# Patient Record
Sex: FEMALE | Race: WHITE | NOT HISPANIC OR LATINO | Employment: OTHER | URBAN - METROPOLITAN AREA
[De-identification: names, ages, dates, MRNs, and addresses within clinical notes are randomized per-mention and may not be internally consistent; named-entity substitution may affect disease eponyms.]

---

## 2017-04-07 ENCOUNTER — TELEPHONE (OUTPATIENT)
Dept: ORTHOPEDIC SURGERY | Facility: CLINIC | Age: 62
End: 2017-04-07

## 2017-04-07 DIAGNOSIS — M17.12 PRIMARY OSTEOARTHRITIS OF LEFT KNEE: Primary | ICD-10-CM

## 2017-05-02 ENCOUNTER — OFFICE VISIT (OUTPATIENT)
Dept: ORTHOPEDIC SURGERY | Facility: CLINIC | Age: 62
End: 2017-05-02

## 2017-05-02 VITALS — WEIGHT: 193 LBS | TEMPERATURE: 97.6 F | BODY MASS INDEX: 37.89 KG/M2 | HEIGHT: 60 IN

## 2017-05-02 DIAGNOSIS — M17.12 ARTHRITIS OF LEFT KNEE: ICD-10-CM

## 2017-05-02 DIAGNOSIS — Z96.651 HISTORY OF TOTAL KNEE ARTHROPLASTY, RIGHT: Primary | ICD-10-CM

## 2017-05-02 DIAGNOSIS — M25.562 CHRONIC PAIN OF LEFT KNEE: Primary | ICD-10-CM

## 2017-05-02 DIAGNOSIS — G89.29 CHRONIC PAIN OF LEFT KNEE: Primary | ICD-10-CM

## 2017-05-02 PROCEDURE — 20610 DRAIN/INJ JOINT/BURSA W/O US: CPT | Performed by: NURSE PRACTITIONER

## 2017-05-02 PROCEDURE — 73562 X-RAY EXAM OF KNEE 3: CPT | Performed by: NURSE PRACTITIONER

## 2017-05-02 RX ORDER — ATORVASTATIN CALCIUM 80 MG/1
80 TABLET, FILM COATED ORAL NIGHTLY
COMMUNITY
Start: 2017-04-30 | End: 2022-07-13 | Stop reason: SINTOL

## 2017-05-02 RX ORDER — MELOXICAM 15 MG/1
TABLET ORAL
Refills: 3 | COMMUNITY
Start: 2017-02-03 | End: 2017-11-17 | Stop reason: HOSPADM

## 2017-05-02 RX ORDER — GUAIFENESIN, PSEUDOEPHEDRINE HYDROCHLORIDE 600; 60 MG/1; MG/1
1 TABLET, EXTENDED RELEASE ORAL 2 TIMES DAILY PRN
COMMUNITY
Start: 2012-10-08 | End: 2022-07-13

## 2017-05-02 RX ORDER — LIDOCAINE HYDROCHLORIDE 10 MG/ML
2 INJECTION, SOLUTION INFILTRATION; PERINEURAL
Status: COMPLETED | OUTPATIENT
Start: 2017-05-02 | End: 2017-05-02

## 2017-05-02 RX ORDER — LISINOPRIL 10 MG/1
TABLET ORAL
Refills: 3 | COMMUNITY
Start: 2017-02-03 | End: 2017-11-07 | Stop reason: SDUPTHER

## 2017-05-02 RX ORDER — GABAPENTIN 300 MG/1
CAPSULE ORAL
Refills: 3 | COMMUNITY
Start: 2017-02-03 | End: 2017-11-07 | Stop reason: SDUPTHER

## 2017-05-02 RX ORDER — METHYLPREDNISOLONE ACETATE 80 MG/ML
80 INJECTION, SUSPENSION INTRA-ARTICULAR; INTRALESIONAL; INTRAMUSCULAR; SOFT TISSUE
Status: COMPLETED | OUTPATIENT
Start: 2017-05-02 | End: 2017-05-02

## 2017-05-02 RX ORDER — CLINDAMYCIN HYDROCHLORIDE 300 MG/1
600 CAPSULE ORAL ONCE
Qty: 2 CAPSULE | Refills: 0 | Status: SHIPPED | OUTPATIENT
Start: 2017-05-02 | End: 2017-11-06 | Stop reason: SDUPTHER

## 2017-05-02 RX ORDER — BUPIVACAINE HYDROCHLORIDE 2.5 MG/ML
2 INJECTION, SOLUTION INFILTRATION; PERINEURAL
Status: COMPLETED | OUTPATIENT
Start: 2017-05-02 | End: 2017-05-02

## 2017-05-02 RX ADMIN — METHYLPREDNISOLONE ACETATE 80 MG: 80 INJECTION, SUSPENSION INTRA-ARTICULAR; INTRALESIONAL; INTRAMUSCULAR; SOFT TISSUE at 13:58

## 2017-05-02 RX ADMIN — BUPIVACAINE HYDROCHLORIDE 2 ML: 2.5 INJECTION, SOLUTION INFILTRATION; PERINEURAL at 13:58

## 2017-05-02 RX ADMIN — LIDOCAINE HYDROCHLORIDE 2 ML: 10 INJECTION, SOLUTION INFILTRATION; PERINEURAL at 13:58

## 2017-07-17 ENCOUNTER — TELEPHONE (OUTPATIENT)
Dept: ORTHOPEDIC SURGERY | Facility: CLINIC | Age: 62
End: 2017-07-17

## 2017-07-18 ENCOUNTER — PREP FOR SURGERY (OUTPATIENT)
Dept: OTHER | Facility: HOSPITAL | Age: 62
End: 2017-07-18

## 2017-07-18 DIAGNOSIS — M17.12 ARTHRITIS OF LEFT KNEE: Primary | ICD-10-CM

## 2017-11-02 NOTE — PROGRESS NOTES
Pre-Operative Orthopedic Assessment      Patient: Risa Jimenez    YOB: 1955      Age/Gender: 62 y.o. female  Medical Record Number: [id number]  Surgical Procedure Planned: LEFT TOTAL KNEE ARTHROPLASTY WITH SARAH NAVIGATION     Surgeon: Jose Muñoz MD    Date of Surgery Planned: 11/15/2017    Question Value Score    Patient Score   1. What is your age group? 50-65 years  66-75 years  >75 years =2  =1  =0 2   2. Gender? Male  Female =2  =1 1   3. How far on average can you walk? (a block is 200 meters) Two blocks or more (+\- rest)  1-2 blocks (+\- rest)  Housebound (most of time) =2  =1  =0 0   4. Which gait aid to you use? (more often than not) None  Single-Point Stick  Crutches/Frame =2  =1  =0 2   5. Do you use community  supports? (home help, meals on wheels, district nursing) None or one per week  Two or more per week =1  =0 1   6. Will you live with someone who can care for you after your operation? Yes  No =3  =0 3      Your Score (out of 12)  9     Key Destination at Discharge from acute care predicted by score   Scores < 6  -- extended inpatient rehabilitation   Scores 6-9 -- additional intervention to discharge directly home (Rehabilitation in home)   Scores > 9 -- directly home         Discharge Disposition/Planning:     Patient Response   Discussed the Predicted discharge disposition needed based on RAPT Assessment with the patient.    yes   Patient selected discharge disposition:   home   Out Patient Rehabilitation Facility of Choice:    none   Home Health Services Preferred:   [facility]   Has the patient completed or been scheduled to complete pre-operative testing? yes   Post-Operative Care Giver Name and Phone Number:    Sister Fabi  [phone]     Subacute Inpatient Rehabilitation:  Complete this section only if planning inpatient services at a Subacute Facility     Patient Response   Subacute Facility Preferred (Please list 2 facilities:      Requires  "pre-certification for inpatient rehabilitation services?         Planned source of transportation to inpatient rehabilitation facility?       If choosing inpatient services at an Acute or Subacute Facility please list a subsequent back-up plan (in case patient fails to qualify for inpatient rehabilitation). Back-up plans should include caregiver (family member or friend) for first 24-48 post- -operatively.       Home Equipment Patient Response   Does patient have a walker for home use?    no   Does patient have a 3 in 1 commode for home use?    no   Does patient have a shower chair for home use?    Can get one if needed   Does patient have an elevated commode seat for home use? Does not feel she will need one as she is \"short\"   Does patient have a cane for home use?    no   Is there any other medical equipment in the home? If so,  List in comment section below no   Pre-Operative Class Attendance Patient Response   Attended or scheduled to attend the pre-operative class within 1 year of total joint replacement? yes   Not planning to attend the pre-operative class (see comments below)    Scheduled and plans to attend   Patient Education  Completed   Expected time of discharge discussed yes   Encouraged to attend Pre-Operative Class    yes   Education re: Back-up plan for patients planning discharge to subacute facilities n/a   Education re: Predicted Discharge Disposition based on RAPT score    yes   Patient receptive and voiced understanding of information given    yes                                                                                                            Comments:    Address verified.  Patient will discharge to sister's home at:  [address] [facility] [address] [facility], [facility].  There are 2 steps to enter with a handrail present then bedroom will be on first level. In patients home bedroom is on second floor. Patient would like agency for HH that was used after right knee and [facility] reveals that [facility] provided " services   in the home. Sister will be able to assist as needed.                                       Signature: Laura Nath RN    Date:  11/2/2017

## 2017-11-06 DIAGNOSIS — Z96.651 HISTORY OF TOTAL KNEE ARTHROPLASTY, RIGHT: ICD-10-CM

## 2017-11-06 RX ORDER — CLINDAMYCIN HYDROCHLORIDE 300 MG/1
CAPSULE ORAL
Qty: 2 CAPSULE | Refills: 3 | Status: SHIPPED | OUTPATIENT
Start: 2017-11-06 | End: 2017-11-07 | Stop reason: SDUPTHER

## 2017-11-07 ENCOUNTER — APPOINTMENT (OUTPATIENT)
Dept: PREADMISSION TESTING | Facility: HOSPITAL | Age: 62
End: 2017-11-07

## 2017-11-07 VITALS
SYSTOLIC BLOOD PRESSURE: 129 MMHG | HEART RATE: 73 BPM | TEMPERATURE: 98.2 F | WEIGHT: 190 LBS | OXYGEN SATURATION: 98 % | BODY MASS INDEX: 37.3 KG/M2 | DIASTOLIC BLOOD PRESSURE: 86 MMHG | RESPIRATION RATE: 20 BRPM | HEIGHT: 60 IN

## 2017-11-07 DIAGNOSIS — M17.12 ARTHRITIS OF LEFT KNEE: ICD-10-CM

## 2017-11-07 LAB
ANION GAP SERPL CALCULATED.3IONS-SCNC: 13.1 MMOL/L
BILIRUB UR QL STRIP: NEGATIVE
BUN BLD-MCNC: 23 MG/DL (ref 8–23)
BUN/CREAT SERPL: 25.3 (ref 7–25)
CALCIUM SPEC-SCNC: 10.2 MG/DL (ref 8.6–10.5)
CHLORIDE SERPL-SCNC: 101 MMOL/L (ref 98–107)
CLARITY UR: CLEAR
CO2 SERPL-SCNC: 24.9 MMOL/L (ref 22–29)
COLOR UR: YELLOW
CREAT BLD-MCNC: 0.91 MG/DL (ref 0.57–1)
DEPRECATED RDW RBC AUTO: 44.6 FL (ref 37–54)
ERYTHROCYTE [DISTWIDTH] IN BLOOD BY AUTOMATED COUNT: 12.9 % (ref 11.7–13)
GFR SERPL CREATININE-BSD FRML MDRD: 62 ML/MIN/1.73
GLUCOSE BLD-MCNC: 112 MG/DL (ref 65–99)
GLUCOSE UR STRIP-MCNC: NEGATIVE MG/DL
HCT VFR BLD AUTO: 37.2 % (ref 35.6–45.5)
HGB BLD-MCNC: 11.9 G/DL (ref 11.9–15.5)
HGB UR QL STRIP.AUTO: NEGATIVE
KETONES UR QL STRIP: NEGATIVE
LEUKOCYTE ESTERASE UR QL STRIP.AUTO: NEGATIVE
MCH RBC QN AUTO: 30.4 PG (ref 26.9–32)
MCHC RBC AUTO-ENTMCNC: 32 G/DL (ref 32.4–36.3)
MCV RBC AUTO: 94.9 FL (ref 80.5–98.2)
NITRITE UR QL STRIP: NEGATIVE
PH UR STRIP.AUTO: 7 [PH] (ref 5–8)
PLATELET # BLD AUTO: 287 10*3/MM3 (ref 140–500)
PMV BLD AUTO: 9.5 FL (ref 6–12)
POTASSIUM BLD-SCNC: 5 MMOL/L (ref 3.5–5.2)
PROT UR QL STRIP: NEGATIVE
RBC # BLD AUTO: 3.92 10*6/MM3 (ref 3.9–5.2)
SODIUM BLD-SCNC: 139 MMOL/L (ref 136–145)
SP GR UR STRIP: 1.02 (ref 1–1.03)
UROBILINOGEN UR QL STRIP: NORMAL
WBC NRBC COR # BLD: 6.31 10*3/MM3 (ref 4.5–10.7)

## 2017-11-07 PROCEDURE — 93005 ELECTROCARDIOGRAM TRACING: CPT

## 2017-11-07 PROCEDURE — 80048 BASIC METABOLIC PNL TOTAL CA: CPT | Performed by: ORTHOPAEDIC SURGERY

## 2017-11-07 PROCEDURE — 93010 ELECTROCARDIOGRAM REPORT: CPT | Performed by: INTERNAL MEDICINE

## 2017-11-07 PROCEDURE — 36415 COLL VENOUS BLD VENIPUNCTURE: CPT

## 2017-11-07 PROCEDURE — 81003 URINALYSIS AUTO W/O SCOPE: CPT | Performed by: ORTHOPAEDIC SURGERY

## 2017-11-07 PROCEDURE — 85027 COMPLETE CBC AUTOMATED: CPT | Performed by: ORTHOPAEDIC SURGERY

## 2017-11-07 RX ORDER — CHOLECALCIFEROL (VITAMIN D3) 50 MCG
2000 TABLET ORAL DAILY
COMMUNITY
End: 2019-04-08

## 2017-11-07 RX ORDER — TRAMADOL HYDROCHLORIDE 50 MG/1
50 TABLET ORAL EVERY 6 HOURS PRN
COMMUNITY
End: 2017-11-17 | Stop reason: HOSPADM

## 2017-11-07 RX ORDER — FLUTICASONE PROPIONATE 50 MCG
2 SPRAY, SUSPENSION (ML) NASAL DAILY
COMMUNITY

## 2017-11-07 RX ORDER — MELOXICAM 15 MG/1
15 TABLET ORAL DAILY
Status: ON HOLD | COMMUNITY
End: 2017-11-15 | Stop reason: SDUPTHER

## 2017-11-07 RX ORDER — GABAPENTIN 300 MG/1
900 CAPSULE ORAL NIGHTLY
COMMUNITY
End: 2019-04-08

## 2017-11-07 RX ORDER — LISINOPRIL 10 MG/1
10 TABLET ORAL NIGHTLY
COMMUNITY
End: 2022-07-13 | Stop reason: SDUPTHER

## 2017-11-07 ASSESSMENT — KOOS JR
KOOS JR SCORE: 52.465
KOOS JR SCORE: 14

## 2017-11-07 NOTE — DISCHARGE INSTRUCTIONS
Take the following medications the morning of surgery with a small sip of water:  NONE      General Instructions:  • Do not eat solid food after midnight the night before surgery.  • You may drink clear liquids day of surgery but must stop at least one hour before your hospital arrival time.  • It is beneficial for you to have a clear drink that contains carbohydrates the day of surgery.  We suggest a 12 to 20 ounce bottle of Gatorade or Powerade for non-diabetic patients or a 12 to 20 ounce bottle of G2 or Powerade Zero for diabetic patients. (Pediatric patients, are not advised to drink a 12 to 20 ounce carbohydrate drink)    Clear liquids are liquids you can see through.  Nothing red in color.     Plain water                               Sports drinks  Sodas                                   Gelatin (Jell-O)  Fruit juices without pulp such as white grape juice and apple juice  Popsicles that contain no fruit or yogurt  Tea or coffee (no cream or milk added)  Gatorade / Powerade  G2 / Powerade Zero    • Infants may have breast milk up to four hours before surgery.  • Infants drinking formula may drink formula up to six hours before surgery.   • Patients who avoid smoking, chewing tobacco and alcohol for 4 weeks prior to surgery have a reduced risk of post-operative complications.  Quit smoking as many days before surgery as you can.  • Do not smoke, use chewing tobacco or drink alcohol the day of surgery.   • If applicable bring your C-PAP/ BI-PAP machine.  • Bring any papers given to you in the doctor’s office.  • Wear clean comfortable clothes and socks.  • Do not wear contact lenses or make-up.  Bring a case for your glasses.   • Bring crutches or walker if applicable.  • Remove all piercings.  Leave jewelry and any other valuables at home.  • The Pre-Admission Testing nurse will instruct you to bring medications if unable to obtain an accurate list in Pre-Admission Testing.        If you were given a blood  bank ID arm band remember to bring it with you the day of surgery.    Preventing a Surgical Site Infection:  • For 2 to 3 days before surgery, avoid shaving with a razor because the razor can irritate skin and make it easier to develop an infection.  • The night prior to surgery sleep in a clean bed with clean clothing.  Do not allow pets to sleep with you.  • Shower on the morning of surgery using a fresh bar of anti-bacterial soap (such as Dial) and clean washcloth.  Dry with a clean towel and dress in clean clothing.  • Ask your surgeon if you will be receiving antibiotics prior to surgery.  • Make sure you, your family, and all healthcare providers clean their hands with soap and water or an alcohol based hand  before caring for you or your wound.    Day of surgery: 11/15/2017 ARRIVAL TIME 7:00 AM  Upon arrival, a Pre-op nurse and Anesthesiologist will review your health history, obtain vital signs, and answer questions you may have.  The only belongings needed at this time will be your home medications and if applicable your C-PAP/BI-PAP machine.  If you are staying overnight your family can leave the rest of your belongings in the car and bring them to your room later.  A Pre-op nurse will start an IV and you may receive medication in preparation for surgery, including something to help you relax.  Your family will be able to see you in the Pre-op area.  While you are in surgery your family should notify the waiting room  if they leave the waiting room area and provide a contact phone number.    Please be aware that surgery does come with discomfort.  We want to make every effort to control your discomfort so please discuss any uncontrolled symptoms with your nurse.   Your doctor will most likely have prescribed pain medications.      If you are going home after surgery you will receive individualized written care instructions before being discharged.  A responsible adult must drive you to  and from the hospital on the day of your surgery and stay with you for 24 hours.    If you are staying overnight following surgery, you will be transported to your hospital room following the recovery period.  [facility] has all private rooms.    If you have any questions please call Pre-Admission Testing at [phone].  Deductibles and co-payments are collected on the day of service. Please be prepared to pay the required co-pay, deductible or deposit on the day of service as defined by your plan.    2% CHLORAHEXIDINE GLUCONATE* CLOTH  Preparing or “prepping” skin before surgery can reduce the risk of infection at the surgical site. To make the process easier, [facility] has chosen disposable cloths moistened with a rinse-free, 2% Chlorhexidine Gluconate (CHG) antiseptic solution. The steps below outline the prepping process and should be carefully followed.        Use the prep cloth on the area that is circled in the diagram             Directions Night before Surgery  1) Shower using a fresh bar of anti-bacterial soap (such as Dial) and clean washcloth.  Use a clean towel to completely dry your skin.  2) Do not use any lotions, oils or creams on your skin.  3) Open the package and remove 1 cloth, wipe your skin for 30 seconds in a circular motion.  Allow to dry for 3 minutes.  4) Repeat #3 with second cloth.  5) Do not touch your eyes, ears, or mouth with the prep cloth.  6) Allow the wet prep solution to air dry.  7) Discard the prep cloth and wash your hands with soap and water.   8) Dress in clean bed clothes and sleep on fresh clean bed sheets.   9) You may experience some temporary itching after the prep.    Directions Day of Surgery  1) Repeat steps 1,2,3,4,5,6,7, and 9.   2) Dress in clean clothes before coming to the hospital.    BACTROBAN NASAL OINTMENT  There are many germs normally in your nose. Bactroban is an ointment that will help reduce these germs. Please follow these  instructions for Bactroban use:      ____The day before surgery in the morning  Date________    ____The day before surgery in the evening              Date________    ____The day of surgery in the morning    Date________    **Squirt ½ package of Bactroban Ointment onto a cotton applicator and apply to inside of 1st nostril.  Squirt the remaining Bactroban and apply to the inside of the other nostril.

## 2017-11-10 ENCOUNTER — OFFICE VISIT (OUTPATIENT)
Dept: ORTHOPEDIC SURGERY | Facility: CLINIC | Age: 62
End: 2017-11-10

## 2017-11-10 VITALS — WEIGHT: 190.2 LBS | BODY MASS INDEX: 37.34 KG/M2 | HEIGHT: 60 IN | TEMPERATURE: 97.9 F

## 2017-11-10 DIAGNOSIS — M17.12 ARTHRITIS OF LEFT KNEE: Primary | ICD-10-CM

## 2017-11-10 PROCEDURE — S0260 H&P FOR SURGERY: HCPCS | Performed by: NURSE PRACTITIONER

## 2017-11-10 PROCEDURE — 73562 X-RAY EXAM OF KNEE 3: CPT | Performed by: NURSE PRACTITIONER

## 2017-11-10 NOTE — PATIENT INSTRUCTIONS
[facility]    KNEE HOME EXERCISES      It is important that you maintain and possibly improve your strength and range of motion of your knee.      •  Walk frequently for short intervals  •  Using a cane or walker to allow you to walk safely if needed  •  Avoid sitting for long periods of time to avoid cramping and swelling of your leg   •  Do exercises either on a bed or in a chair.  •  If any of the exercises cause you pain, either eliminate that exercise or decrease          the motion or repetitions      Start exercises with 10 repetitions and increase to 30 repetitions.  Do two sets of each exercise each day    ANKLE PUMPS    1. Move your feet up and down as if you are pumping on a gas pedal       STRAIGHT LEG RAISES    1. Lie flat with your injured leg straight.  Keep the other leg bent.  2. Tighten the injured leg's thigh muscle.  3. Lift leg, with knee locked in the straight position no higher than the other knee for  seconds  4. Lower leg slowly. Rest for 5 seconds      SHORT ARC QUAD    1. Lie with both knees bent over a pillow  2. Straighten both knees  3. Hold 5 seconds  4. Bend knees back to starting position and repeat sequence       QUADRICEPS     1. Lie flat with your injured let straight.  Keep the other leg bent.  2. Tighten the injured leg's thigh muscle by trying to move your kneecap toward the  thigh.  3. Make your leg as stiff as you can.  4. Hold for 5 seconds and relax for 5 seconds        HAMSTRING STRETCH    1. Lie flat with your injured leg straight. Keep the other leg bent  2. Press your heel of your injured leg into the floor for 5 seconds       OR  1. Sit with injured leg out straight.   2. Loop a sheet around the ball of foot and toes.  3. Gently pull on the sheet, keeping the leg straight  4. Hold for 10-30 seconds      KNEE FLEXION-EXTENSION SITTING     1. Sit with injured let bent as shown  2. Straighten leg at the knee  3. Hold 5 seconds  4. Bend knee back  to starting position   5. Rest for 2-5 seconds and repeat sequence       HEEL SLIDES     1. Lie on back with legs straight  2. Slide heels toward buttocks  3. Return to starting position       HEEL SLIDS WITH SHEET    1. Lie on your back with a sheet wrapped around your foot  2. Pull on the sheet to assist you in bending your knee and sliding your heel toward  your buttocks  3. Hold for 5 seconds        KNEE FLEXION STRETCH    1. Sit in a chair  2. Bend bad knee back as far as you can   3. Hold stretch for 5-10 seconds      CHAIR PUSH UPS    1. Sit in a chair with arm rests  2. Place hands on armrests  3. Straighten arms, raising buttocks up off of chair         WALL SLIDES    1. Stand with your back and head against a wall.    2. Keep your feet shoulder width apart and 6-12 inches from the wall  3. Slowly slide down the wall until your knees are slightly bent.    4. Hold for 5 seconds and then slowly slide back up  5. As you get stronger, then you can slowly increase the bend in your knees, but do  not drop your buttocks below the level of your knees       STEP UPS    1. Stand with your foot on your injured leg on a 3-5 inch support (such as a block of  wood)  2. Place other foot on the floor  3. Shift your weight onto the foot on the block and try to straighten the knee and  raising the other foot off of the floor  4. Slowly lower your foot until only the heel touches the floor

## 2017-11-10 NOTE — PROGRESS NOTES
History & Physical       Patient: Risa Jimenez  YOB: 1955  Medical Record Number: [id number]  Body mass index is 37.15 kg/(m^2).    Surgeon:  Dr. Jose Muñoz    Chief Complaints:   Chief Complaint   Patient presents with   • Left Knee - Pre-op Exam       Subjective:    History of Present Illness: 62 y.o. female presents with   Chief Complaint   Patient presents with   • Left Knee - Pre-op Exam   . Onset of symptoms was years ago and has been progressively worsening despite more conservative treatment measures.  Symptoms are associated with ability to move, exercise, and perform activities of daily living.  Symptoms are aggravated by weight bearing and ROM necessary for activities of daily living.   Symptoms improve with rest, ice and elevation only minimally.      Allergies:   Allergies   Allergen Reactions   • Moxifloxacin Other (See Comments)     HYPERTENSION   • Penicillins Rash       Medications:   Home Medications:  Current Outpatient Prescriptions on File Prior to Visit   Medication Sig   • atorvastatin (LIPITOR) 80 MG tablet Take 80 mg by mouth Every Night.   • CHLORHEXIDINE GLUCONATE CLOTH EX Apply  topically. AS DIRECTED PREOP   • Cholecalciferol (VITAMIN D) 2000 units tablet Take 2,000 Units by mouth Daily. HOLD PRIOR TO SURG   • fluticasone (FLONASE) 50 MCG/ACT nasal spray 2 sprays into each nostril Daily.   • gabapentin (NEURONTIN) 300 MG capsule Take 900 mg by mouth Every Night.   • lisinopril (PRINIVIL,ZESTRIL) 10 MG tablet Take 10 mg by mouth Every Night.   • meloxicam (MOBIC) 15 MG tablet TK 1 T PO QD   • meloxicam (MOBIC) 15 MG tablet Take 15 mg by mouth Daily. HOLD PRIOR TO SURG   • mupirocin (BACTROBAN) 2 % nasal ointment into each nostril. AS DIRECTED PREOP   • pseudoephedrine-guaifenesin (MUCINEX D)  MG per 12 hr tablet Take 1 tablet by mouth 2 (Two) Times a Day As Needed.   • sertraline (ZOLOFT) 50 MG tablet Take 75 mg by mouth Daily.   • traMADol (ULTRAM) 50 MG tablet  Take 50 mg by mouth Every 6 (Six) Hours As Needed for Moderate Pain  (1-2 TABLETS).     No current facility-administered medications on file prior to visit.      Current Medications:  Scheduled Meds:  Continuous Infusions:  No current facility-administered medications for this visit.   PRN Meds:.    I have reviewed the patient's medical history in detail and updated the computerized patient record.  Review and summarization of old records include:    Past Medical History:   Diagnosis Date   • Arthritis    • High cholesterol    • Hypertension    • Left knee pain    • Left leg pain    • Sciatic leg pain     LEFT        Past Surgical History:   Procedure Laterality Date   • BACK SURGERY  2012    Lumbar   • BREAST BIOPSY     • CHOLECYSTECTOMY     • JOINT REPLACEMENT Right 2015   • SINUS SURGERY          Social History     Occupational History   • Not on file.     Social History Main Topics   • Smoking status: Never Smoker   • Smokeless tobacco: Never Used   • Alcohol use Yes      Comment: OCCASIONALLY   • Drug use: No   • Sexual activity: Not on file    Social History     Social History Narrative        Family History   Problem Relation Age of Onset   • Hypertension Sister    • Malig Hyperthermia Neg Hx        ROS: 14 point review of systems was performed and was negative except for documented findings in HPI and today's encounter.     Allergies:   Allergies   Allergen Reactions   • Moxifloxacin Other (See Comments)     HYPERTENSION   • Penicillins Rash     Constitutional:  Denies fever, shaking or chills   Eyes:  Denies change in visual acuity   HENT:  Denies nasal congestion or sore throat   Respiratory:  Denies cough or shortness of breath   Cardiovascular:  Denies chest pain or severe LE edema   GI:  Denies abdominal pain, nausea, vomiting, bloody stools or diarrhea   Musculoskeletal:  Denies numbness tingling or loss of motor function except as outlined above in history of present illness.  : Denies painful  urination or hematuria  Integument:  Denies rash, lesion or ulceration   Neurologic:  Denies headache or focal weakness  Endocrine:  Denies lymphadenopathy  Psych:  Denies confusion or change in mental status   Hem:  Denies active bleeding    Physical Exam: 62 y.o. female  Body mass index is 37.15 kg/(m^2)., @HT@, @WT@  Vitals:    11/10/17 0919   Temp: 97.9 °F (36.6 °C)     Vital signs reviewed.   General Appearance:    Alert, cooperative, in no acute distress                  Eyes: conjunctiva clear  ENT: external ears and nose atraumatic  CV: no peripheral edema  Resp: normal respiratory effort  Skin: no rashes or wounds; normal turgor  Psych: mood and affect appropriate  Lymph: no nodes appreciated  Neuro: gross sensation intact  Vascular:  Palpable peripheral pulse in noted extremity  Musculoskeletal Extremities: DETAILED KNEE Exam: Left knee: Painful gait w/wo limp, muscle atrophy, erythema, ecchymosis, or gross deformity noted, Large knee effusion, + medial joint line tenderness, Active range of motion normal, 5/5 strength flexion and extension, The knee is stable to varus and valgus stress testing, VARUS VALGUS NEUTRAL: varus alignment of the limb, Lachman negative, Posterior drawer negative, Corbin's negative, Patellofemoral grind +, Sensation grossly intact to light tough throughout the lower extremity, Skin is intact, Distal pulses are palpable, No signs or symptoms of DVT          Diagnostic Tests:  Appointment on 11/07/2017   Component Date Value Ref Range Status   • Glucose 11/07/2017 112* 65 - 99 mg/dL Final   • BUN 11/07/2017 23  8 - 23 mg/dL Final   • Creatinine 11/07/2017 0.91  0.57 - 1.00 mg/dL Final   • Sodium 11/07/2017 139  136 - 145 mmol/L Final   • Potassium 11/07/2017 5.0  3.5 - 5.2 mmol/L Final   • Chloride 11/07/2017 101  98 - 107 mmol/L Final   • CO2 11/07/2017 24.9  22.0 - 29.0 mmol/L Final   • Calcium 11/07/2017 10.2  8.6 - 10.5 mg/dL Final   • eGFR Non African Amer 11/07/2017 62  >60  mL/min/1.73 Final   • BUN/Creatinine Ratio 11/07/2017 25.3* 7.0 - 25.0 Final   • Anion Gap 11/07/2017 13.1  mmol/L Final   • WBC 11/07/2017 6.31  4.50 - 10.70 10*3/mm3 Final   • RBC 11/07/2017 3.92  3.90 - 5.20 10*6/mm3 Final   • Hemoglobin 11/07/2017 11.9  11.9 - 15.5 g/dL Final   • Hematocrit 11/07/2017 37.2  35.6 - 45.5 % Final   • MCV 11/07/2017 94.9  80.5 - 98.2 fL Final   • MCH 11/07/2017 30.4  26.9 - 32.0 pg Final   • MCHC 11/07/2017 32.0* 32.4 - 36.3 g/dL Final   • RDW 11/07/2017 12.9  11.7 - 13.0 % Final   • RDW-SD 11/07/2017 44.6  37.0 - 54.0 fl Final   • MPV 11/07/2017 9.5  6.0 - 12.0 fL Final   • Platelets 11/07/2017 287  140 - 500 10*3/mm3 Final   • Color, UA 11/07/2017 Yellow  Yellow, Straw Final   • Appearance, UA 11/07/2017 Clear  Clear Final   • pH, UA 11/07/2017 7.0  5.0 - 8.0 Final   • Specific Gravity, UA 11/07/2017 1.018  1.005 - 1.030 Final   • Glucose, UA 11/07/2017 Negative  Negative Final   • Ketones, UA 11/07/2017 Negative  Negative Final   • Bilirubin, UA 11/07/2017 Negative  Negative Final   • Blood, UA 11/07/2017 Negative  Negative Final   • Protein, UA 11/07/2017 Negative  Negative Final   • Leuk Esterase, UA 11/07/2017 Negative  Negative Final   • Nitrite, UA 11/07/2017 Negative  Negative Final   • Urobilinogen, UA 11/07/2017 0.2 E.U./dL  0.2 - 1.0 E.U./dL Final     No results found.    Imaging was done today, images were personally viewed and discussed at length with the patient:    Indication: pain related symptoms,  Views: 3V AP, LAT & 40 degree PA left knee(s)   Findings: severe end-stage arthritis (bone on bone, subchondral sclerosis/cysts, osteophytes), S/P right  Total Knee Replacement in good position and alignment  Comparison views: viewed last xray done in the office.     Assessment:  Patient Active Problem List   Diagnosis   • Chronic pain of left knee   • Arthritis of left knee       Plan:  Dr. Jose Muñoz reviewed anatomy of a total joint arthroplasty in laymen's  terms, as well as typical postoperative recovery and possibly 6-12 months for maximal recovery, and possible need for rehabilitation stay after hospitalization. We also discussed risks, benefits, alternatives, and limitations of procedure with risks including but not limited to neurovascular damage, bleeding, infection, malalignment, chronic pian, failure of implants, osteolysis, loosening of implants, loss of motion, weakness, stiffness, instability, DVT, pulmonary embolus, death, stroke, complex regional pain syndrome, myocardial infarction, and need for additional procedures. Concept of substitution vs. replacement discussed.  No guarantees were given regarding results of surgery.      Risa Jimenez was given the opportunity to ask and have all questions answered today.  The patient voiced understanding of the risks, benefits, and alternative forms of treatment that were discussed and the patient consents to proceed with surgery.     Patient's blood clot history is negative.  Planned DVT prophylaxis for surgery:  Aspirin    Discharge Plan: POD 2-3 to home and home health    Patient was seen by CARMEN Meyer in the office today.    Date: 11/10/2017  CARMEN Pierre

## 2017-11-15 ENCOUNTER — APPOINTMENT (OUTPATIENT)
Dept: GENERAL RADIOLOGY | Facility: HOSPITAL | Age: 62
End: 2017-11-15

## 2017-11-15 ENCOUNTER — ANESTHESIA EVENT (OUTPATIENT)
Dept: PERIOP | Facility: HOSPITAL | Age: 62
End: 2017-11-15

## 2017-11-15 ENCOUNTER — ANESTHESIA (OUTPATIENT)
Dept: PERIOP | Facility: HOSPITAL | Age: 62
End: 2017-11-15

## 2017-11-15 ENCOUNTER — HOSPITAL ENCOUNTER (INPATIENT)
Facility: HOSPITAL | Age: 62
LOS: 2 days | Discharge: HOME HEALTH | End: 2017-11-17
Attending: ORTHOPAEDIC SURGERY | Admitting: ORTHOPAEDIC SURGERY

## 2017-11-15 DIAGNOSIS — M17.12 ARTHRITIS OF LEFT KNEE: ICD-10-CM

## 2017-11-15 DIAGNOSIS — R26.2 DIFFICULTY WALKING: Primary | ICD-10-CM

## 2017-11-15 LAB — GLUCOSE BLDC GLUCOMTR-MCNC: 113 MG/DL (ref 70–130)

## 2017-11-15 PROCEDURE — 20985 CPTR-ASST DIR MS PX: CPT | Performed by: ORTHOPAEDIC SURGERY

## 2017-11-15 PROCEDURE — C1776 JOINT DEVICE (IMPLANTABLE): HCPCS | Performed by: ORTHOPAEDIC SURGERY

## 2017-11-15 PROCEDURE — 25010000002 MORPHINE PER 10 MG: Performed by: ANESTHESIOLOGY

## 2017-11-15 PROCEDURE — 25010000002 MORPHINE SULFATE (PF) 8 MG/ML SOLUTION 1 ML VIAL: Performed by: ORTHOPAEDIC SURGERY

## 2017-11-15 PROCEDURE — C1713 ANCHOR/SCREW BN/BN,TIS/BN: HCPCS | Performed by: ORTHOPAEDIC SURGERY

## 2017-11-15 PROCEDURE — 27447 TOTAL KNEE ARTHROPLASTY: CPT | Performed by: NURSE PRACTITIONER

## 2017-11-15 PROCEDURE — 27447 TOTAL KNEE ARTHROPLASTY: CPT | Performed by: ORTHOPAEDIC SURGERY

## 2017-11-15 PROCEDURE — 25010000002 EPINEPHRINE PER 0.1 MG: Performed by: ORTHOPAEDIC SURGERY

## 2017-11-15 PROCEDURE — 25010000002 VANCOMYCIN: Performed by: ORTHOPAEDIC SURGERY

## 2017-11-15 PROCEDURE — 25010000002 PROPOFOL 10 MG/ML EMULSION: Performed by: ANESTHESIOLOGY

## 2017-11-15 PROCEDURE — 25010000002 FENTANYL CITRATE (PF) 100 MCG/2ML SOLUTION: Performed by: ANESTHESIOLOGY

## 2017-11-15 PROCEDURE — 97110 THERAPEUTIC EXERCISES: CPT

## 2017-11-15 PROCEDURE — 8E0YXBZ COMPUTER ASSISTED PROCEDURE OF LOWER EXTREMITY: ICD-10-PCS | Performed by: ORTHOPAEDIC SURGERY

## 2017-11-15 PROCEDURE — 82962 GLUCOSE BLOOD TEST: CPT

## 2017-11-15 PROCEDURE — 25010000002 VANCOMYCIN 10 G RECONSTITUTED SOLUTION: Performed by: ORTHOPAEDIC SURGERY

## 2017-11-15 PROCEDURE — 25010000002 KETOROLAC TROMETHAMINE PER 15 MG: Performed by: ORTHOPAEDIC SURGERY

## 2017-11-15 PROCEDURE — 25010000002 MIDAZOLAM PER 1 MG: Performed by: ANESTHESIOLOGY

## 2017-11-15 PROCEDURE — 25010000002 DEXAMETHASONE PER 1 MG: Performed by: ANESTHESIOLOGY

## 2017-11-15 PROCEDURE — 0SRD0J9 REPLACEMENT OF LEFT KNEE JOINT WITH SYNTHETIC SUBSTITUTE, CEMENTED, OPEN APPROACH: ICD-10-PCS | Performed by: ORTHOPAEDIC SURGERY

## 2017-11-15 PROCEDURE — 97162 PT EVAL MOD COMPLEX 30 MIN: CPT

## 2017-11-15 PROCEDURE — 25010000002 NEOSTIGMINE PER 0.5 MG: Performed by: ANESTHESIOLOGY

## 2017-11-15 PROCEDURE — 25010000002 ONDANSETRON PER 1 MG: Performed by: ANESTHESIOLOGY

## 2017-11-15 PROCEDURE — 25010000002 ROPIVACAINE PER 1 MG: Performed by: ORTHOPAEDIC SURGERY

## 2017-11-15 PROCEDURE — 73560 X-RAY EXAM OF KNEE 1 OR 2: CPT

## 2017-11-15 DEVICE — SIGMA TIBIAL INSERT FIXED BEARING CURVED PLUS 2.5 10MM XLK
Type: IMPLANTABLE DEVICE | Status: FUNCTIONAL
Brand: SIGMA

## 2017-11-15 DEVICE — P.F.C. SIGMA TIBIAL TRAY FIXED BEARING MODULAR COCR 2.5 CEMENTED
Type: IMPLANTABLE DEVICE | Status: FUNCTIONAL
Brand: P.F.C. SIGMA

## 2017-11-15 DEVICE — SMARTSET GMV HIGH PERFORMANCE GENTAMICIN MEDIUM VISCOSITY BONE CEMENT 40G
Type: IMPLANTABLE DEVICE | Status: FUNCTIONAL
Brand: SMARTSET

## 2017-11-15 DEVICE — SIGMA FEMORAL CRUCIATE RETAINING CEMENTED 2.5 LEFT
Type: IMPLANTABLE DEVICE | Status: FUNCTIONAL
Brand: SIGMA

## 2017-11-15 DEVICE — IMPLANTABLE DEVICE: Type: IMPLANTABLE DEVICE | Status: FUNCTIONAL

## 2017-11-15 RX ORDER — OXYCODONE AND ACETAMINOPHEN 7.5; 325 MG/1; MG/1
2 TABLET ORAL
Status: DISCONTINUED | OUTPATIENT
Start: 2017-11-15 | End: 2017-11-17 | Stop reason: HOSPADM

## 2017-11-15 RX ORDER — SODIUM CHLORIDE 0.9 % (FLUSH) 0.9 %
1-10 SYRINGE (ML) INJECTION AS NEEDED
Status: DISCONTINUED | OUTPATIENT
Start: 2017-11-15 | End: 2017-11-15 | Stop reason: HOSPADM

## 2017-11-15 RX ORDER — ROCURONIUM BROMIDE 10 MG/ML
INJECTION, SOLUTION INTRAVENOUS AS NEEDED
Status: DISCONTINUED | OUTPATIENT
Start: 2017-11-15 | End: 2017-11-15 | Stop reason: SURG

## 2017-11-15 RX ORDER — DIPHENHYDRAMINE HYDROCHLORIDE 50 MG/ML
6.25 INJECTION INTRAMUSCULAR; INTRAVENOUS
Status: DISCONTINUED | OUTPATIENT
Start: 2017-11-15 | End: 2017-11-15 | Stop reason: HOSPADM

## 2017-11-15 RX ORDER — ONDANSETRON 4 MG/1
4 TABLET, ORALLY DISINTEGRATING ORAL EVERY 6 HOURS PRN
Status: DISCONTINUED | OUTPATIENT
Start: 2017-11-15 | End: 2017-11-17 | Stop reason: HOSPADM

## 2017-11-15 RX ORDER — BISACODYL 10 MG
10 SUPPOSITORY, RECTAL RECTAL DAILY PRN
Status: DISCONTINUED | OUTPATIENT
Start: 2017-11-15 | End: 2017-11-17 | Stop reason: HOSPADM

## 2017-11-15 RX ORDER — GUAIFENESIN 600 MG/1
600 TABLET, EXTENDED RELEASE ORAL EVERY 12 HOURS PRN
Status: DISCONTINUED | OUTPATIENT
Start: 2017-11-15 | End: 2017-11-17 | Stop reason: HOSPADM

## 2017-11-15 RX ORDER — OXYCODONE AND ACETAMINOPHEN 7.5; 325 MG/1; MG/1
1 TABLET ORAL
Status: DISCONTINUED | OUTPATIENT
Start: 2017-11-15 | End: 2017-11-17 | Stop reason: HOSPADM

## 2017-11-15 RX ORDER — OXYCODONE AND ACETAMINOPHEN 7.5; 325 MG/1; MG/1
2 TABLET ORAL
Status: DISCONTINUED | OUTPATIENT
Start: 2017-11-15 | End: 2017-11-15

## 2017-11-15 RX ORDER — EPHEDRINE SULFATE 50 MG/ML
5 INJECTION, SOLUTION INTRAVENOUS ONCE AS NEEDED
Status: DISCONTINUED | OUTPATIENT
Start: 2017-11-15 | End: 2017-11-15 | Stop reason: HOSPADM

## 2017-11-15 RX ORDER — HYDROMORPHONE HYDROCHLORIDE 1 MG/ML
0.5 INJECTION, SOLUTION INTRAMUSCULAR; INTRAVENOUS; SUBCUTANEOUS
Status: DISCONTINUED | OUTPATIENT
Start: 2017-11-15 | End: 2017-11-15 | Stop reason: HOSPADM

## 2017-11-15 RX ORDER — NALOXONE HCL 0.4 MG/ML
0.1 VIAL (ML) INJECTION
Status: DISCONTINUED | OUTPATIENT
Start: 2017-11-15 | End: 2017-11-17 | Stop reason: HOSPADM

## 2017-11-15 RX ORDER — PROPOFOL 10 MG/ML
VIAL (ML) INTRAVENOUS AS NEEDED
Status: DISCONTINUED | OUTPATIENT
Start: 2017-11-15 | End: 2017-11-15 | Stop reason: SURG

## 2017-11-15 RX ORDER — OXYCODONE HYDROCHLORIDE AND ACETAMINOPHEN 5; 325 MG/1; MG/1
2 TABLET ORAL ONCE AS NEEDED
Status: DISCONTINUED | OUTPATIENT
Start: 2017-11-15 | End: 2017-11-15 | Stop reason: HOSPADM

## 2017-11-15 RX ORDER — BISACODYL 5 MG/1
10 TABLET, DELAYED RELEASE ORAL DAILY PRN
Status: DISCONTINUED | OUTPATIENT
Start: 2017-11-15 | End: 2017-11-17 | Stop reason: HOSPADM

## 2017-11-15 RX ORDER — LABETALOL HYDROCHLORIDE 5 MG/ML
5 INJECTION, SOLUTION INTRAVENOUS
Status: DISCONTINUED | OUTPATIENT
Start: 2017-11-15 | End: 2017-11-15 | Stop reason: HOSPADM

## 2017-11-15 RX ORDER — DIPHENHYDRAMINE HYDROCHLORIDE 50 MG/ML
25 INJECTION INTRAMUSCULAR; INTRAVENOUS EVERY 6 HOURS PRN
Status: DISCONTINUED | OUTPATIENT
Start: 2017-11-15 | End: 2017-11-17 | Stop reason: HOSPADM

## 2017-11-15 RX ORDER — DEXAMETHASONE SODIUM PHOSPHATE 10 MG/ML
INJECTION INTRAMUSCULAR; INTRAVENOUS AS NEEDED
Status: DISCONTINUED | OUTPATIENT
Start: 2017-11-15 | End: 2017-11-15 | Stop reason: SURG

## 2017-11-15 RX ORDER — FENTANYL CITRATE 50 UG/ML
50 INJECTION, SOLUTION INTRAMUSCULAR; INTRAVENOUS
Status: DISCONTINUED | OUTPATIENT
Start: 2017-11-15 | End: 2017-11-15 | Stop reason: HOSPADM

## 2017-11-15 RX ORDER — ASPIRIN 325 MG
325 TABLET, DELAYED RELEASE (ENTERIC COATED) ORAL 2 TIMES DAILY
Status: DISCONTINUED | OUTPATIENT
Start: 2017-11-15 | End: 2017-11-17 | Stop reason: HOSPADM

## 2017-11-15 RX ORDER — FLUMAZENIL 0.1 MG/ML
0.2 INJECTION INTRAVENOUS AS NEEDED
Status: DISCONTINUED | OUTPATIENT
Start: 2017-11-15 | End: 2017-11-15 | Stop reason: HOSPADM

## 2017-11-15 RX ORDER — HYDROMORPHONE HCL IN 0.9% NACL 10 MG/50ML
PATIENT CONTROLLED ANALGESIA SYRINGE INTRAVENOUS CONTINUOUS
Status: DISCONTINUED | OUTPATIENT
Start: 2017-11-15 | End: 2017-11-17 | Stop reason: HOSPADM

## 2017-11-15 RX ORDER — GUAIFENESIN, PSEUDOEPHEDRINE HYDROCHLORIDE 600; 60 MG/1; MG/1
1 TABLET, EXTENDED RELEASE ORAL 2 TIMES DAILY PRN
Status: DISCONTINUED | OUTPATIENT
Start: 2017-11-15 | End: 2017-11-15 | Stop reason: CLARIF

## 2017-11-15 RX ORDER — HYDROCODONE BITARTRATE AND ACETAMINOPHEN 7.5; 325 MG/1; MG/1
1 TABLET ORAL ONCE AS NEEDED
Status: DISCONTINUED | OUTPATIENT
Start: 2017-11-15 | End: 2017-11-15 | Stop reason: HOSPADM

## 2017-11-15 RX ORDER — DIPHENHYDRAMINE HCL 25 MG
50 CAPSULE ORAL EVERY 6 HOURS PRN
Status: DISCONTINUED | OUTPATIENT
Start: 2017-11-15 | End: 2017-11-17 | Stop reason: HOSPADM

## 2017-11-15 RX ORDER — OXYCODONE AND ACETAMINOPHEN 7.5; 325 MG/1; MG/1
1 TABLET ORAL
Status: DISCONTINUED | OUTPATIENT
Start: 2017-11-15 | End: 2017-11-15

## 2017-11-15 RX ORDER — ONDANSETRON 2 MG/ML
4 INJECTION INTRAMUSCULAR; INTRAVENOUS EVERY 6 HOURS PRN
Status: DISCONTINUED | OUTPATIENT
Start: 2017-11-15 | End: 2017-11-17 | Stop reason: HOSPADM

## 2017-11-15 RX ORDER — MIDAZOLAM HYDROCHLORIDE 1 MG/ML
2 INJECTION INTRAMUSCULAR; INTRAVENOUS
Status: DISCONTINUED | OUTPATIENT
Start: 2017-11-15 | End: 2017-11-15 | Stop reason: HOSPADM

## 2017-11-15 RX ORDER — HYDRALAZINE HYDROCHLORIDE 20 MG/ML
5 INJECTION INTRAMUSCULAR; INTRAVENOUS
Status: DISCONTINUED | OUTPATIENT
Start: 2017-11-15 | End: 2017-11-15 | Stop reason: HOSPADM

## 2017-11-15 RX ORDER — DOCUSATE SODIUM 100 MG/1
100 CAPSULE, LIQUID FILLED ORAL 2 TIMES DAILY
Status: DISCONTINUED | OUTPATIENT
Start: 2017-11-15 | End: 2017-11-17 | Stop reason: HOSPADM

## 2017-11-15 RX ORDER — GABAPENTIN 300 MG/1
900 CAPSULE ORAL NIGHTLY
Status: DISCONTINUED | OUTPATIENT
Start: 2017-11-15 | End: 2017-11-17 | Stop reason: HOSPADM

## 2017-11-15 RX ORDER — GLYCOPYRROLATE 0.2 MG/ML
INJECTION INTRAMUSCULAR; INTRAVENOUS AS NEEDED
Status: DISCONTINUED | OUTPATIENT
Start: 2017-11-15 | End: 2017-11-15 | Stop reason: SURG

## 2017-11-15 RX ORDER — ONDANSETRON 2 MG/ML
4 INJECTION INTRAMUSCULAR; INTRAVENOUS ONCE AS NEEDED
Status: DISCONTINUED | OUTPATIENT
Start: 2017-11-15 | End: 2017-11-15 | Stop reason: HOSPADM

## 2017-11-15 RX ORDER — FENTANYL CITRATE 50 UG/ML
INJECTION, SOLUTION INTRAMUSCULAR; INTRAVENOUS AS NEEDED
Status: DISCONTINUED | OUTPATIENT
Start: 2017-11-15 | End: 2017-11-15 | Stop reason: SURG

## 2017-11-15 RX ORDER — SODIUM CHLORIDE, SODIUM LACTATE, POTASSIUM CHLORIDE, CALCIUM CHLORIDE 600; 310; 30; 20 MG/100ML; MG/100ML; MG/100ML; MG/100ML
9 INJECTION, SOLUTION INTRAVENOUS CONTINUOUS
Status: DISCONTINUED | OUTPATIENT
Start: 2017-11-15 | End: 2017-11-17 | Stop reason: HOSPADM

## 2017-11-15 RX ORDER — MORPHINE SULFATE 1 MG/ML
INJECTION, SOLUTION EPIDURAL; INTRATHECAL; INTRAVENOUS AS NEEDED
Status: DISCONTINUED | OUTPATIENT
Start: 2017-11-15 | End: 2017-11-15 | Stop reason: SURG

## 2017-11-15 RX ORDER — PSEUDOEPHEDRINE HYDROCHLORIDE 60 MG/1
60 TABLET, FILM COATED ORAL EVERY 12 HOURS PRN
Status: DISCONTINUED | OUTPATIENT
Start: 2017-11-15 | End: 2017-11-17 | Stop reason: HOSPADM

## 2017-11-15 RX ORDER — MIDAZOLAM HYDROCHLORIDE 1 MG/ML
1 INJECTION INTRAMUSCULAR; INTRAVENOUS
Status: DISCONTINUED | OUTPATIENT
Start: 2017-11-15 | End: 2017-11-15 | Stop reason: HOSPADM

## 2017-11-15 RX ORDER — ONDANSETRON 4 MG/1
4 TABLET, FILM COATED ORAL EVERY 6 HOURS PRN
Status: DISCONTINUED | OUTPATIENT
Start: 2017-11-15 | End: 2017-11-17 | Stop reason: HOSPADM

## 2017-11-15 RX ORDER — SODIUM CHLORIDE, SODIUM LACTATE, POTASSIUM CHLORIDE, CALCIUM CHLORIDE 600; 310; 30; 20 MG/100ML; MG/100ML; MG/100ML; MG/100ML
100 INJECTION, SOLUTION INTRAVENOUS CONTINUOUS
Status: ACTIVE | OUTPATIENT
Start: 2017-11-15 | End: 2017-11-16

## 2017-11-15 RX ORDER — MAGNESIUM HYDROXIDE 1200 MG/15ML
LIQUID ORAL AS NEEDED
Status: DISCONTINUED | OUTPATIENT
Start: 2017-11-15 | End: 2017-11-15 | Stop reason: HOSPADM

## 2017-11-15 RX ORDER — PROMETHAZINE HYDROCHLORIDE 12.5 MG/1
12.5 TABLET ORAL EVERY 6 HOURS PRN
Status: DISCONTINUED | OUTPATIENT
Start: 2017-11-15 | End: 2017-11-17 | Stop reason: HOSPADM

## 2017-11-15 RX ORDER — ACETAMINOPHEN 325 MG/1
325 TABLET ORAL EVERY 4 HOURS PRN
Status: DISCONTINUED | OUTPATIENT
Start: 2017-11-15 | End: 2017-11-17 | Stop reason: HOSPADM

## 2017-11-15 RX ORDER — ONDANSETRON 2 MG/ML
INJECTION INTRAMUSCULAR; INTRAVENOUS AS NEEDED
Status: DISCONTINUED | OUTPATIENT
Start: 2017-11-15 | End: 2017-11-15 | Stop reason: SURG

## 2017-11-15 RX ORDER — ATORVASTATIN CALCIUM 80 MG/1
80 TABLET, FILM COATED ORAL NIGHTLY
Status: DISCONTINUED | OUTPATIENT
Start: 2017-11-15 | End: 2017-11-17 | Stop reason: HOSPADM

## 2017-11-15 RX ORDER — FAMOTIDINE 10 MG/ML
20 INJECTION, SOLUTION INTRAVENOUS ONCE
Status: COMPLETED | OUTPATIENT
Start: 2017-11-15 | End: 2017-11-15

## 2017-11-15 RX ORDER — FLUTICASONE PROPIONATE 50 MCG
2 SPRAY, SUSPENSION (ML) NASAL DAILY
Status: DISCONTINUED | OUTPATIENT
Start: 2017-11-15 | End: 2017-11-17 | Stop reason: HOSPADM

## 2017-11-15 RX ORDER — LISINOPRIL 10 MG/1
10 TABLET ORAL NIGHTLY
Status: DISCONTINUED | OUTPATIENT
Start: 2017-11-15 | End: 2017-11-17 | Stop reason: HOSPADM

## 2017-11-15 RX ADMIN — ROCURONIUM BROMIDE 10 MG: 10 INJECTION INTRAVENOUS at 09:15

## 2017-11-15 RX ADMIN — DOCUSATE SODIUM 100 MG: 100 CAPSULE, LIQUID FILLED ORAL at 17:01

## 2017-11-15 RX ADMIN — PROPOFOL 200 MG: 10 INJECTION, EMULSION INTRAVENOUS at 09:01

## 2017-11-15 RX ADMIN — FAMOTIDINE 20 MG: 10 INJECTION, SOLUTION INTRAVENOUS at 08:33

## 2017-11-15 RX ADMIN — SODIUM CHLORIDE, POTASSIUM CHLORIDE, SODIUM LACTATE AND CALCIUM CHLORIDE 9 ML/HR: 600; 310; 30; 20 INJECTION, SOLUTION INTRAVENOUS at 08:21

## 2017-11-15 RX ADMIN — VANCOMYCIN HYDROCHLORIDE 1250 MG: 10 INJECTION, POWDER, LYOPHILIZED, FOR SOLUTION INTRAVENOUS at 19:39

## 2017-11-15 RX ADMIN — GLYCOPYRROLATE 0.4 MG: 0.2 INJECTION INTRAMUSCULAR; INTRAVENOUS at 10:30

## 2017-11-15 RX ADMIN — POLYETHYLENE GLYCOL 3350 17 G: 17 POWDER, FOR SOLUTION ORAL at 17:01

## 2017-11-15 RX ADMIN — FLUTICASONE PROPIONATE 2 SPRAY: 50 SPRAY, METERED NASAL at 16:48

## 2017-11-15 RX ADMIN — MIDAZOLAM 2 MG: 1 INJECTION INTRAMUSCULAR; INTRAVENOUS at 08:33

## 2017-11-15 RX ADMIN — LISINOPRIL 10 MG: 10 TABLET ORAL at 21:28

## 2017-11-15 RX ADMIN — NEOSTIGMINE METHYLSULFATE 4 MG: 1 INJECTION INTRAMUSCULAR; INTRAVENOUS; SUBCUTANEOUS at 10:30

## 2017-11-15 RX ADMIN — SODIUM CHLORIDE, POTASSIUM CHLORIDE, SODIUM LACTATE AND CALCIUM CHLORIDE: 600; 310; 30; 20 INJECTION, SOLUTION INTRAVENOUS at 10:25

## 2017-11-15 RX ADMIN — ASPIRIN 325 MG: 325 TABLET, DELAYED RELEASE ORAL at 17:00

## 2017-11-15 RX ADMIN — GABAPENTIN 900 MG: 300 CAPSULE ORAL at 21:28

## 2017-11-15 RX ADMIN — MORPHINE SULFATE 2 MG: 1 INJECTION EPIDURAL; INTRATHECAL; INTRAVENOUS at 10:15

## 2017-11-15 RX ADMIN — ONDANSETRON 4 MG: 2 INJECTION INTRAMUSCULAR; INTRAVENOUS at 10:20

## 2017-11-15 RX ADMIN — MORPHINE SULFATE 2 MG: 1 INJECTION EPIDURAL; INTRATHECAL; INTRAVENOUS at 10:00

## 2017-11-15 RX ADMIN — MORPHINE SULFATE 2 MG: 1 INJECTION EPIDURAL; INTRATHECAL; INTRAVENOUS at 09:50

## 2017-11-15 RX ADMIN — VANCOMYCIN HYDROCHLORIDE 1250 MG: 10 INJECTION, POWDER, LYOPHILIZED, FOR SOLUTION INTRAVENOUS at 08:20

## 2017-11-15 RX ADMIN — MORPHINE SULFATE 2 MG: 1 INJECTION EPIDURAL; INTRATHECAL; INTRAVENOUS at 10:30

## 2017-11-15 RX ADMIN — VANCOMYCIN HYDROCHLORIDE 1.25 G: 10 INJECTION, POWDER, LYOPHILIZED, FOR SOLUTION INTRAVENOUS at 08:54

## 2017-11-15 RX ADMIN — FENTANYL CITRATE 50 MCG: 50 INJECTION INTRAMUSCULAR; INTRAVENOUS at 11:49

## 2017-11-15 RX ADMIN — ACETAMINOPHEN 325 MG: 325 TABLET ORAL at 19:39

## 2017-11-15 RX ADMIN — Medication: at 11:21

## 2017-11-15 RX ADMIN — ROCURONIUM BROMIDE 40 MG: 10 INJECTION INTRAVENOUS at 09:03

## 2017-11-15 RX ADMIN — SODIUM CHLORIDE, POTASSIUM CHLORIDE, SODIUM LACTATE AND CALCIUM CHLORIDE 9 ML/HR: 600; 310; 30; 20 INJECTION, SOLUTION INTRAVENOUS at 11:22

## 2017-11-15 RX ADMIN — MUPIROCIN 1 APPLICATION: 20 OINTMENT TOPICAL at 17:07

## 2017-11-15 RX ADMIN — FENTANYL CITRATE 50 MCG: 50 INJECTION INTRAMUSCULAR; INTRAVENOUS at 12:05

## 2017-11-15 RX ADMIN — DEXAMETHASONE SODIUM PHOSPHATE 8 MG: 10 INJECTION INTRAMUSCULAR; INTRAVENOUS at 09:10

## 2017-11-15 RX ADMIN — FENTANYL CITRATE 50 MCG: 50 INJECTION, SOLUTION INTRAMUSCULAR; INTRAVENOUS at 08:55

## 2017-11-15 RX ADMIN — ATORVASTATIN CALCIUM 80 MG: 80 TABLET, FILM COATED ORAL at 21:28

## 2017-11-15 RX ADMIN — MORPHINE SULFATE 2 MG: 1 INJECTION EPIDURAL; INTRATHECAL; INTRAVENOUS at 10:39

## 2017-11-15 RX ADMIN — DOCUSATE SODIUM 100 MG: 100 CAPSULE, LIQUID FILLED ORAL at 16:59

## 2017-11-15 RX ADMIN — SERTRALINE 75 MG: 50 TABLET, FILM COATED ORAL at 16:47

## 2017-11-15 RX ADMIN — FENTANYL CITRATE 50 MCG: 50 INJECTION, SOLUTION INTRAMUSCULAR; INTRAVENOUS at 08:59

## 2017-11-15 NOTE — OP NOTE
Operative Note    Name: Risa Jimenez  YOB: 1955  MRN: [id number]  BMI: There is no height or weight on file to calculate BMI. 37    DATE OF SURGERY: 11/15/2017    PREOPERATIVE DIAGNOSIS: left knee end-stage osteoarthritis    POSTOPERATIVE DIAGNOSIS: left knee end-stage osteoarthritis    PROCEDURE PERFORMED: left total knee replacement with [place] navigation    SURGEON: Dr. Jose Muñoz    ASSISTANT: CARMEN Meyer    IMPLANTS: Depuy PFC size 2.5L femoral component, size 2.5 tibial baseplate, 10mm polyethylene insert, patelloplasty    Estimated Blood Loss: 100cc  Specimens : none  Complications: none  22 Modifier:  increased BMI of 37 requiring increased time, effort, and physical exertion of the surgeon and the assistant    DESCRIPTION OF PROCEDURE: The patient was taken to the operating room and placed in the supine position. Preoperative antibiotics were administered. Surgical time out was performed. After adequate induction of anesthesia, the leg was prepped and draped in the usual sterile fashion.  Surgical time out was performed. The leg was exsanguinated with an Esmarch bandage and the tourniquet inflated to 250 mmHg. A midline incision was performed followed by a medial parapatellar arthrotomy. The patella was subluxed laterally.  A portion of the fat pad, ACL, and anterior horns of the meniscus were excised.  The [employer] Shield navigation device was affixed and navigated. The distal cut was made. The femur was then sized with a sizing guide. The femoral cutting block was placed and all femoral cuts were performed. The proximal tibia was exposed. [place] navigation protocol was accomplished.  9 millimeters were removed.  We used the extramedullary tibial cutting guide set for removal of 3mm of bone off the low side. The tibial cut was performed. The posterior horns of the menisci were excised. The posterior osteophytes were removed. Flexion extension blocks were then used to balance the knee.  The tibial cut surface was then sized with the sizing templates and the tibial and femoral trial were then placed. The tray was aligned with the middle third of the tubercle.    Attention was then placed to the patella. The patella was balanced to track centrally through range of motion.  It measured 18 and patelloplasty was performed.   At this point all trial components were removed, the knee was copiously irrigated with pulsed lavage, and the knee was injected with anesthetic cocktail solution. The cut surfaces were then dried with clean lap sponges, and the components were cemented tibia, followed by femura. The knee was held in full extension and all excess cement was removed. The knee was held still until the cement had completely hardened. We then placed the trial polyethylene spacer which resulted in full extension and excellent flexion-extension balance. We placed the final polyethylene spacer.   The knee was then copiously irrigated with the full 3 liters. The tourniquet was then released. There was excellent hemostasis. We closed the knee in multiple layers in standard fashion. Sterile dressing were applied. At the end of the case, the sponge and needle counts were reported as being correct. There were no known complications. The patient was then transported to the recovery room.    Surgical assistant performed retraction, suction, hemostasis, and specific limb positioning and manipulation required for accuracy of joint placement, and assistance in wound closure and dressing application.       Jose Muñoz M.D.

## 2017-11-15 NOTE — PLAN OF CARE
Problem: Patient Care Overview (Adult)  Goal: Plan of Care Review  Outcome: Ongoing (interventions implemented as appropriate)    11/15/17 1576   Coping/Psychosocial Response Interventions   Plan Of Care Reviewed With patient   Patient Care Overview   Progress improving   Outcome Evaluation   Outcome Summary/Follow up Plan arrived from surgery, VSS, minimal c/o pain. tolerating PT.up tochair and voided. educated on monitoring bp and meds due to hx of HTN       Goal: Adult Individualization and Mutuality  Outcome: Ongoing (interventions implemented as appropriate)  Goal: Discharge Needs Assessment  Outcome: Ongoing (interventions implemented as appropriate)    Problem: Perioperative Period (Adult)  Goal: Signs and Symptoms of Listed Potential Problems Will be Absent or Manageable (Perioperative Period)  Outcome: Ongoing (interventions implemented as appropriate)    Problem: Knee Replacement, Total (Adult)  Goal: Signs and Symptoms of Listed Potential Problems Will be Absent or Manageable (Knee Replacement, Total)  Outcome: Ongoing (interventions implemented as appropriate)    Problem: Fall Risk (Adult)  Goal: Identify Related Risk Factors and Signs and Symptoms  Outcome: Outcome(s) achieved Date Met:  11/15/17  Goal: Absence of Falls  Outcome: Ongoing (interventions implemented as appropriate)

## 2017-11-15 NOTE — ANESTHESIA POSTPROCEDURE EVALUATION
Patient: Risa Jimenez    Procedure Summary     Date Anesthesia Start Anesthesia Stop Room / Location    11/15/17 0854 1053  [facility]       Procedure Diagnosis Surgeon Provider    LEFT TOTAL KNEE ARTHROPLASTY WITH SARAH NAVIGATION (Left Knee) Arthritis of left knee  (Arthritis of left knee [M19.90]) MD Dustin Leon MD          Anesthesia Type: general  Last vitals  BP   148/98 (11/15/17 1200)   Temp   36.4 °C (97.5 °F) (11/15/17 1049)   Pulse   75 (11/15/17 1200)   Resp   16 (11/15/17 1200)     SpO2   94 % (11/15/17 1200)     Post Anesthesia Care and Evaluation    Patient location during evaluation: PACU  Anesthetic complications: No anesthetic complications

## 2017-11-15 NOTE — THERAPY EVALUATION
Acute Care - Physical Therapy Initial Evaluation  [facility]     Patient Name: Risa Jimenez  : 1955  MRN: [id number]  Today's Date: 11/15/2017   Onset of Illness/Injury or Date of Surgery Date:  (POD 0 L TKA)            Admit Date: 11/15/2017     Visit Dx:    ICD-10-CM ICD-9-CM   1. Difficulty walking R26.2 719.7   2. Arthritis of left knee M19.90 716.96     Patient Active Problem List   Diagnosis   • Chronic pain of left knee   • Arthritis of left knee     Past Medical History:   Diagnosis Date   • Arthritis    • High cholesterol    • Hypertension    • Left knee pain    • Left leg pain    • Sciatic leg pain     LEFT     Past Surgical History:   Procedure Laterality Date   • BACK SURGERY      Lumbar   • BREAST BIOPSY     • CHOLECYSTECTOMY     • JOINT REPLACEMENT Right 2015   • SINUS SURGERY            PT ASSESSMENT (last 72 hours)      PT Evaluation       11/15/17 1500 11/15/17 0821    Rehab Evaluation    Document Type evaluation  -MG     Subjective Information agree to therapy;no complaints  -MG     Patient Effort, Rehab Treatment good  -MG     Symptoms Noted During/After Treatment none  -MG     General Information    Onset of Illness/Injury or Date of Surgery Date --   POD 0 L TKA  -MG     Precautions/Limitations fall precautions  -MG     Prior Level of Function independent:  -MG     Equipment Currently Used at Home cane, straight  -MG cane, straight  -DK    Plans/Goals Discussed With patient;family  -MG     Barriers to Rehab none identified  -MG     Living Environment    Living Environment Comment going to live with sister, 2 stairs to enter   -MG     Clinical Impression    Patient/Family Goals Statement return home  -MG     Criteria for Skilled Therapeutic Interventions Met yes;treatment indicated  -MG     Impairments Found (describe specific impairments) aerobic capacity/endurance;gait, locomotion, and balance;muscle performance;ROM  -MG     Rehab Potential good, to achieve stated therapy goals   -MG     Pain Assessment    Pain Assessment No/denies pain  -MG     Cognitive Assessment/Intervention    Current Cognitive/Communication Assessment functional  -MG     Orientation Status oriented x 4  -MG     Follows Commands/Answers Questions 100% of the time  -MG     Personal Safety WNL/WFL  -MG     Personal Safety Interventions fall prevention program maintained;gait belt  -MG     ROM (Range of Motion)    General ROM --   0-60 L knee  -MG     MMT (Manual Muscle Testing)    General MMT Assessment Detail L post op knee weakness   -MG     Bed Mobility, Assessment/Treatment    Bed Mob, Supine to Sit, [place] contact guard assist  -MG     Bed Mob, Sit to Supine, [place] not tested  -MG     Transfer Assessment/Treatment    Transfers, Sit-Stand [place] contact guard assist  -MG     Transfers, Stand-Sit [place] contact guard assist  -MG     Transfers, Sit-Stand-Sit, Assist Device rolling walker  -MG     Toilet Transfer, [place] contact guard assist  -MG     Toilet Transfer, Assistive Device rolling walker  -MG     Transfer, Safety Issues balance decreased during turns;step length decreased;weight-shifting ability decreased  -MG     Gait Assessment/Treatment    Gait, [place] Level contact guard assist  -MG     Gait, Assistive Device rolling walker  -MG     Gait, Distance (Feet) 50  -MG     Gait, Gait Pattern Analysis swing-to gait  -MG     Gait, Safety Issues balance decreased during turns;step length decreased  -MG     Gait, Impairments strength decreased;impaired balance  -MG     Therapy Exercises    Exercise Protocols total knee  -MG     Total Knee Exercises left:;10 reps;completed protocol  -MG     Positioning and Restraints    Pre-Treatment Position in bed  -MG     Post Treatment Position chair  -MG     In Chair reclined;call light within reach;encouraged to call for assist;with family/caregiver  -MG       11/15/17 0803       General Information    Equipment Currently Used at Home none   -DK     Living Environment    Lives With alone  -DK     Living Arrangements house  -DK     Home Accessibility no concerns;stairs within home  -DK     Number of Stairs Within Home 12  -DK     Stair Railings at Home inside, present at both sides  -DK     Type of Financial/Environmental Concern none  -DK     Transportation Available car;family or friend will provide  -DK       User Key  (r) = Recorded By, (t) = Taken By, (c) = Cosigned By    Initials Name Provider Type    DK Danelle Zavala, RN Registered Nurse     Megan Gosselin, PT Physical Therapist          Physical Therapy Education     Title: PT OT SLP Therapies (Done)     Topic: Physical Therapy (Done)     Point: Mobility training (Done)    Learning Progress Summary    Learner Readiness Method Response Comment Documented by Status   Patient Acceptance E VU,NR   11/15/17 1509 Done               Point: Home exercise program (Done)    Learning Progress Summary    Learner Readiness Method Response Comment Documented by Status   Patient Acceptance E VU,NR  MG 11/15/17 1509 Done               Point: Body mechanics (Done)    Learning Progress Summary    Learner Readiness Method Response Comment Documented by Status   Patient Acceptance E VU,NR   11/15/17 1509 Done               Point: Precautions (Done)    Learning Progress Summary    Learner Readiness Method Response Comment Documented by Status   Patient Acceptance E VU,NR   11/15/17 1509 Done                      User Key     Initials Effective Dates Name Provider Type Discipline     09/13/17 -  Megan Gosselin, PT Physical Therapist PT                PT Recommendation and Plan  Anticipated Equipment Needs At Discharge:  (has equipment )  Anticipated Discharge Disposition: home with home health  Planned Therapy Interventions: balance training, bed mobility training, gait training, home exercise program, ROM (Range of Motion), patient/family education, stair training, strengthening, transfer training  PT  Frequency: 2 times/day  Plan of Care Review  Outcome Summary/Follow up Plan: Pt is POD0 L TKA. Pt previously independent with SPC. Pt to discharge home with sister.  Pt demosntrates decreased strength, ROm and balance. Will see pt to improve independence. Anticipate d/c home with C when meidcally able.           IP PT Goals       11/15/17 1509          Bed Mobility PT LTG    Bed Mobility PT LTG, Date Established 11/15/17  -MG      Bed Mobility PT LTG, Time to Achieve 4 days  -MG      Bed Mobility PT LTG, Activity Type all bed mobility  -MG      Bed Mobility PT LTG, [place] Level independent  -MG      Transfer Training PT LTG    Transfer Training PT LTG, Date Established 11/15/17  -MG      Transfer Training PT LTG, Time to Achieve 4 days  -MG      Transfer Training PT LTG, Activity Type all transfers  -MG      Transfer Training PT LTG, [place] Level supervision required  -MG      Transfer Training PT LTG, Assist Device walker, rolling  -MG      Gait Training PT LTG    Gait Training Goal PT LTG, Date Established 11/15/17  -MG      Gait Training Goal PT LTG, Time to Achieve 4 days  -MG      Gait Training Goal PT LTG, [place] Level supervision required  -MG      Gait Training Goal PT LTG, Assist Device walker, rolling  -MG      Gait Training Goal PT LTG, Distance to Achieve 150  -MG      Stair Training PT LTG    Stair Training Goal PT LTG, Date Established 11/15/17  -MG      Stair Training Goal PT LTG, Time to Achieve 4 days  -MG      Stair Training Goal PT LTG, Number of Steps 2  -MG      Stair Training Goal PT LTG, [place] Level supervision required  -MG      Stair Training Goal PT LTG, Assist Device 1 handrail  -MG      Patient Education PT LTG    Patient Education PT LTG, Date Established 11/15/17  -MG      Patient Education PT LTG, Time to Achieve 4 days  -MG      Patient Education PT LTG, Education Type HEP  -MG      Patient Education PT LTG, Education Understanding demonstrate  adequately;verbalize understanding  -MG        User Key  (r) = Recorded By, (t) = Taken By, (c) = Cosigned By    Initials Name Provider Type    MG Megan Gosselin, PT Physical Therapist                Outcome Measures       11/15/17 1500          How much help from another person do you currently need...    Turning from your back to your side while in flat bed without using bedrails? 3  -MG      Moving from lying on back to sitting on the side of a flat bed without bedrails? 3  -MG      Moving to and from a bed to a chair (including a wheelchair)? 3  -MG      Standing up from a chair using your arms (e.g., wheelchair, bedside chair)? 3  -MG      Climbing 3-5 steps with a railing? 2  -MG      To walk in hospital room? 3  -MG      AM-PAC 6 Clicks Score 17  -MG      Functional Assessment    Outcome Measure Options AM-PAC 6 Clicks Basic Mobility (PT)  -MG        User Key  (r) = Recorded By, (t) = Taken By, (c) = Cosigned By    Initials Name Provider Type    MG Megan Gosselin, PT Physical Therapist           Time Calculation:         PT Charges       11/15/17 1512          Time Calculation    Start Time 1422  -MG      Stop Time 1448  -MG      Time Calculation (min) 26 min  -MG      PT Received On 11/15/17  -MG      PT - Next Appointment 11/16/17  -MG      PT Goal Re-Cert Due Date 11/18/17  -MG        User Key  (r) = Recorded By, (t) = Taken By, (c) = Cosigned By    Initials Name Provider Type    MG Megan Gosselin, PT Physical Therapist          Therapy Charges for Today     Code Description Service Date Service Provider Modifiers Qty    66163694281 HC PT EVAL MOD COMPLEXITY 2 11/15/2017 Megan Gosselin, PT GP 1    67876987973 HC PT THER PROC EA 15 MIN 11/15/2017 Megan Gosselin, PT GP 1          PT G-Codes  Outcome Measure Options: AM-PAC 6 Clicks Basic Mobility (PT)      Megan Gosselin, PT  11/15/2017

## 2017-11-15 NOTE — PROGRESS NOTES
Discharge Planning Assessment  [facility]     Patient Name: Risa Jimenez  MRN: [id number]  Today's Date: 11/15/2017    Admit Date: 11/15/2017          Discharge Needs Assessment     None            Discharge Plan       11/15/17 1405    Case Management/Social Work Plan    Plan home with sister and [facility]    Patient/Family In Agreement With Plan yes    Additional Comments Pre-op assessment reviewed. Facesheet verified.  Spoke with patient in room.  Introduced self and explained role.  At ME, patient plans to stay with here sister at [address] [place] [address] [address] [id number]. She would like [facility] to follow.  Referral called to Veronica/[facility] and she will follow.         Discharge Placement     No information found                Demographic Summary     None            Functional Status     None            Psychosocial     None            Abuse/Neglect     None            Legal     None            Substance Abuse     None            Patient Forms     None          Sheri Brice RN

## 2017-11-15 NOTE — H&P (VIEW-ONLY)
History & Physical       Patient: Risa Jimenez  YOB: 1955  Medical Record Number: [id number]  Body mass index is 37.15 kg/(m^2).    Surgeon:  Dr. Jose Muñoz    Chief Complaints:   Chief Complaint   Patient presents with   • Left Knee - Pre-op Exam       Subjective:    History of Present Illness: 62 y.o. female presents with   Chief Complaint   Patient presents with   • Left Knee - Pre-op Exam   . Onset of symptoms was years ago and has been progressively worsening despite more conservative treatment measures.  Symptoms are associated with ability to move, exercise, and perform activities of daily living.  Symptoms are aggravated by weight bearing and ROM necessary for activities of daily living.   Symptoms improve with rest, ice and elevation only minimally.      Allergies:   Allergies   Allergen Reactions   • Moxifloxacin Other (See Comments)     HYPERTENSION   • Penicillins Rash       Medications:   Home Medications:  Current Outpatient Prescriptions on File Prior to Visit   Medication Sig   • atorvastatin (LIPITOR) 80 MG tablet Take 80 mg by mouth Every Night.   • CHLORHEXIDINE GLUCONATE CLOTH EX Apply  topically. AS DIRECTED PREOP   • Cholecalciferol (VITAMIN D) 2000 units tablet Take 2,000 Units by mouth Daily. HOLD PRIOR TO SURG   • fluticasone (FLONASE) 50 MCG/ACT nasal spray 2 sprays into each nostril Daily.   • gabapentin (NEURONTIN) 300 MG capsule Take 900 mg by mouth Every Night.   • lisinopril (PRINIVIL,ZESTRIL) 10 MG tablet Take 10 mg by mouth Every Night.   • meloxicam (MOBIC) 15 MG tablet TK 1 T PO QD   • meloxicam (MOBIC) 15 MG tablet Take 15 mg by mouth Daily. HOLD PRIOR TO SURG   • mupirocin (BACTROBAN) 2 % nasal ointment into each nostril. AS DIRECTED PREOP   • pseudoephedrine-guaifenesin (MUCINEX D)  MG per 12 hr tablet Take 1 tablet by mouth 2 (Two) Times a Day As Needed.   • sertraline (ZOLOFT) 50 MG tablet Take 75 mg by mouth Daily.   • traMADol (ULTRAM) 50 MG tablet  Take 50 mg by mouth Every 6 (Six) Hours As Needed for Moderate Pain  (1-2 TABLETS).     No current facility-administered medications on file prior to visit.      Current Medications:  Scheduled Meds:  Continuous Infusions:  No current facility-administered medications for this visit.   PRN Meds:.    I have reviewed the patient's medical history in detail and updated the computerized patient record.  Review and summarization of old records include:    Past Medical History:   Diagnosis Date   • Arthritis    • High cholesterol    • Hypertension    • Left knee pain    • Left leg pain    • Sciatic leg pain     LEFT        Past Surgical History:   Procedure Laterality Date   • BACK SURGERY  2012    Lumbar   • BREAST BIOPSY     • CHOLECYSTECTOMY     • JOINT REPLACEMENT Right 2015   • SINUS SURGERY          Social History     Occupational History   • Not on file.     Social History Main Topics   • Smoking status: Never Smoker   • Smokeless tobacco: Never Used   • Alcohol use Yes      Comment: OCCASIONALLY   • Drug use: No   • Sexual activity: Not on file    Social History     Social History Narrative        Family History   Problem Relation Age of Onset   • Hypertension Sister    • Malig Hyperthermia Neg Hx        ROS: 14 point review of systems was performed and was negative except for documented findings in HPI and today's encounter.     Allergies:   Allergies   Allergen Reactions   • Moxifloxacin Other (See Comments)     HYPERTENSION   • Penicillins Rash     Constitutional:  Denies fever, shaking or chills   Eyes:  Denies change in visual acuity   HENT:  Denies nasal congestion or sore throat   Respiratory:  Denies cough or shortness of breath   Cardiovascular:  Denies chest pain or severe LE edema   GI:  Denies abdominal pain, nausea, vomiting, bloody stools or diarrhea   Musculoskeletal:  Denies numbness tingling or loss of motor function except as outlined above in history of present illness.  : Denies painful  urination or hematuria  Integument:  Denies rash, lesion or ulceration   Neurologic:  Denies headache or focal weakness  Endocrine:  Denies lymphadenopathy  Psych:  Denies confusion or change in mental status   Hem:  Denies active bleeding    Physical Exam: 62 y.o. female  Body mass index is 37.15 kg/(m^2)., @HT@, @WT@  Vitals:    11/10/17 0919   Temp: 97.9 °F (36.6 °C)     Vital signs reviewed.   General Appearance:    Alert, cooperative, in no acute distress                  Eyes: conjunctiva clear  ENT: external ears and nose atraumatic  CV: no peripheral edema  Resp: normal respiratory effort  Skin: no rashes or wounds; normal turgor  Psych: mood and affect appropriate  Lymph: no nodes appreciated  Neuro: gross sensation intact  Vascular:  Palpable peripheral pulse in noted extremity  Musculoskeletal Extremities: DETAILED KNEE Exam: Left knee: Painful gait w/wo limp, muscle atrophy, erythema, ecchymosis, or gross deformity noted, Large knee effusion, + medial joint line tenderness, Active range of motion normal, 5/5 strength flexion and extension, The knee is stable to varus and valgus stress testing, VARUS VALGUS NEUTRAL: varus alignment of the limb, Lachman negative, Posterior drawer negative, Corbin's negative, Patellofemoral grind +, Sensation grossly intact to light tough throughout the lower extremity, Skin is intact, Distal pulses are palpable, No signs or symptoms of DVT          Diagnostic Tests:  Appointment on 11/07/2017   Component Date Value Ref Range Status   • Glucose 11/07/2017 112* 65 - 99 mg/dL Final   • BUN 11/07/2017 23  8 - 23 mg/dL Final   • Creatinine 11/07/2017 0.91  0.57 - 1.00 mg/dL Final   • Sodium 11/07/2017 139  136 - 145 mmol/L Final   • Potassium 11/07/2017 5.0  3.5 - 5.2 mmol/L Final   • Chloride 11/07/2017 101  98 - 107 mmol/L Final   • CO2 11/07/2017 24.9  22.0 - 29.0 mmol/L Final   • Calcium 11/07/2017 10.2  8.6 - 10.5 mg/dL Final   • eGFR Non African Amer 11/07/2017 62  >60  mL/min/1.73 Final   • BUN/Creatinine Ratio 11/07/2017 25.3* 7.0 - 25.0 Final   • Anion Gap 11/07/2017 13.1  mmol/L Final   • WBC 11/07/2017 6.31  4.50 - 10.70 10*3/mm3 Final   • RBC 11/07/2017 3.92  3.90 - 5.20 10*6/mm3 Final   • Hemoglobin 11/07/2017 11.9  11.9 - 15.5 g/dL Final   • Hematocrit 11/07/2017 37.2  35.6 - 45.5 % Final   • MCV 11/07/2017 94.9  80.5 - 98.2 fL Final   • MCH 11/07/2017 30.4  26.9 - 32.0 pg Final   • MCHC 11/07/2017 32.0* 32.4 - 36.3 g/dL Final   • RDW 11/07/2017 12.9  11.7 - 13.0 % Final   • RDW-SD 11/07/2017 44.6  37.0 - 54.0 fl Final   • MPV 11/07/2017 9.5  6.0 - 12.0 fL Final   • Platelets 11/07/2017 287  140 - 500 10*3/mm3 Final   • Color, UA 11/07/2017 Yellow  Yellow, Straw Final   • Appearance, UA 11/07/2017 Clear  Clear Final   • pH, UA 11/07/2017 7.0  5.0 - 8.0 Final   • Specific Gravity, UA 11/07/2017 1.018  1.005 - 1.030 Final   • Glucose, UA 11/07/2017 Negative  Negative Final   • Ketones, UA 11/07/2017 Negative  Negative Final   • Bilirubin, UA 11/07/2017 Negative  Negative Final   • Blood, UA 11/07/2017 Negative  Negative Final   • Protein, UA 11/07/2017 Negative  Negative Final   • Leuk Esterase, UA 11/07/2017 Negative  Negative Final   • Nitrite, UA 11/07/2017 Negative  Negative Final   • Urobilinogen, UA 11/07/2017 0.2 E.U./dL  0.2 - 1.0 E.U./dL Final     No results found.    Imaging was done today, images were personally viewed and discussed at length with the patient:    Indication: pain related symptoms,  Views: 3V AP, LAT & 40 degree PA left knee(s)   Findings: severe end-stage arthritis (bone on bone, subchondral sclerosis/cysts, osteophytes), S/P right  Total Knee Replacement in good position and alignment  Comparison views: viewed last xray done in the office.     Assessment:  Patient Active Problem List   Diagnosis   • Chronic pain of left knee   • Arthritis of left knee       Plan:  Dr. Jose Muñoz reviewed anatomy of a total joint arthroplasty in laymen's  terms, as well as typical postoperative recovery and possibly 6-12 months for maximal recovery, and possible need for rehabilitation stay after hospitalization. We also discussed risks, benefits, alternatives, and limitations of procedure with risks including but not limited to neurovascular damage, bleeding, infection, malalignment, chronic pian, failure of implants, osteolysis, loosening of implants, loss of motion, weakness, stiffness, instability, DVT, pulmonary embolus, death, stroke, complex regional pain syndrome, myocardial infarction, and need for additional procedures. Concept of substitution vs. replacement discussed.  No guarantees were given regarding results of surgery.      Risa Jimenez was given the opportunity to ask and have all questions answered today.  The patient voiced understanding of the risks, benefits, and alternative forms of treatment that were discussed and the patient consents to proceed with surgery.     Patient's blood clot history is negative.  Planned DVT prophylaxis for surgery:  Aspirin    Discharge Plan: POD 2-3 to home and home health    Patient was seen by CARMEN Meyer in the office today.    Date: 11/10/2017  CARMEN Pierre

## 2017-11-15 NOTE — ANESTHESIA PROCEDURE NOTES
Airway  Urgency: elective    Date/Time: 11/15/2017 9:07 AM  Airway not difficult    General Information and Staff    Patient location during procedure: OR  Anesthesiologist: JANIA SALAS    Indications and Patient Condition  Indications for airway management: airway protection    Preoxygenated: yes  Mask difficulty assessment: 1 - vent by mask    Final Airway Details  Final airway type: endotracheal airway      Successful airway: ETT  Cuffed: yes   Successful intubation technique: direct laryngoscopy  Endotracheal tube insertion site: oral  Blade: Franco  Blade size: #2  ETT size: 7.0 mm  Cormack-Lehane Classification: grade I - full view of glottis  Placement verified by: chest auscultation and capnometry   Measured from: teeth  ETT to teeth (cm): 19  Number of attempts at approach: 1    Additional Comments  Pre oxygenated  Easy mask vent  Atraumatic intubation  + etco2  Breath sounds equal bilaterally

## 2017-11-15 NOTE — PLAN OF CARE
Problem: Patient Care Overview (Adult)  Goal: Plan of Care Review    11/15/17 1509   Outcome Evaluation   Outcome Summary/Follow up Plan Pt is POD0 L TKA. Pt previously independent with SPC. Pt to discharge home with sister. Pt demosntrates decreased strength, ROm and balance. Will see pt to improve independence. Anticipate d/c home with [facility] when meidcally able.          Problem: Inpatient Physical Therapy  Goal: Bed Mobility Goal LTG- PT    11/15/17 1509   Bed Mobility PT LTG   Bed Mobility PT LTG, Date Established 11/15/17   Bed Mobility PT LTG, Time to Achieve 4 days   Bed Mobility PT LTG, Activity Type all bed mobility   Bed Mobility PT LTG, [employer] Level independent       Goal: Transfer Training Goal 1 LTG- PT    11/15/17 1509   Transfer Training PT LTG   Transfer Training PT LTG, Date Established 11/15/17   Transfer Training PT LTG, Time to Achieve 4 days   Transfer Training PT LTG, Activity Type all transfers   Transfer Training PT LTG, [employer] Level supervision required   Transfer Training PT LTG, Assist Device walker, rolling       Goal: Gait Training Goal LTG- PT    11/15/17 1509   Gait Training PT LTG   Gait Training Goal PT LTG, Date Established 11/15/17   Gait Training Goal PT LTG, Time to Achieve 4 days   Gait Training Goal PT LTG, [employer] Level supervision required   Gait Training Goal PT LTG, Assist Device walker, rolling   Gait Training Goal PT LTG, Distance to Achieve 150       Goal: Stair Training Goal LTG- PT    11/15/17 1509   Stair Training PT LTG   Stair Training Goal PT LTG, Date Established 11/15/17   Stair Training Goal PT LTG, Time to Achieve 4 days   Stair Training Goal PT LTG, Number of Steps 2   Stair Training Goal PT LTG, [employer] Level supervision required   Stair Training Goal PT LTG, Assist Device 1 handrail       Goal: Patient Education Goal LTG- PT    11/15/17 1509   Patient Education PT LTG   Patient Education PT LTG, Date Established 11/15/17   Patient  Education PT LTG, Time to Achieve 4 days   Patient Education PT LTG, Education Type HEP   Patient Education PT LTG, Education Understanding demonstrate adequately;verbalize understanding

## 2017-11-15 NOTE — ANESTHESIA PREPROCEDURE EVALUATION
Anesthesia Evaluation     Patient summary reviewed and Nursing notes reviewed   NPO Solid Status: > 8 hours  NPO Liquid Status: > 2 hours     Airway   Mallampati: II  no difficulty expected  Dental - normal exam     Pulmonary     breath sounds clear to auscultation  Cardiovascular     ECG reviewed  Rhythm: regular  Rate: normal    (+) hypertension, hyperlipidemia      Neuro/Psych  (+) numbness,    GI/Hepatic/Renal/Endo    (+) obesity,      Musculoskeletal     (+) arthralgias, back pain,   Abdominal    Substance History      OB/GYN          Other   (+) arthritis                                     Anesthesia Plan    ASA 3     general     intravenous induction   Anesthetic plan and risks discussed with patient.    Dry eyes-- would like lubrication

## 2017-11-15 NOTE — PLAN OF CARE
Problem: Patient Care Overview (Adult)  Goal: Plan of Care Review  Outcome: Ongoing (interventions implemented as appropriate)    11/15/17 0821   Coping/Psychosocial Response Interventions   Plan Of Care Reviewed With patient   Patient Care Overview   Progress no change       Goal: Adult Individualization and Mutuality  Outcome: Ongoing (interventions implemented as appropriate)    11/15/17 0821   Individualization   Patient Specific Preferences krissy Lord   Patient Specific Goals walk without pain   Patient Specific Interventions each S&S of infection   Mutuality/Individual Preferences   What Anxieties, Fears or Concerns Do You Have About Your Health or Care? none   What Questions Do You Have About Your Health or Care? none   What Information Would Help Us Give You More Personalized Care? none       Goal: Discharge Needs Assessment  Outcome: Ongoing (interventions implemented as appropriate)    11/15/17 0803 11/15/17 0821   Discharge Needs Assessment   Concerns To Be Addressed --  denies needs/concerns at this time   Readmission Within The Last 30 Days --  no previous admission in last 30 days   Equipment Needed After Discharge --  none   Current Health   Anticipated Changes Related to Illness --  none   Self-Care   Equipment Currently Used at Home --  cane, straight   Living Environment   Transportation Available car;family or friend will provide --          Problem: Perioperative Period (Adult)  Goal: Signs and Symptoms of Listed Potential Problems Will be Absent or Manageable (Perioperative Period)  Outcome: Ongoing (interventions implemented as appropriate)    11/15/17 0821   Perioperative Period   Problems Assessed (Perioperative Period) pain;hypoxia/hypoxemia;situational response   Problems Present (Perioperative Period) none

## 2017-11-16 LAB
ANION GAP SERPL CALCULATED.3IONS-SCNC: 11.9 MMOL/L
BUN BLD-MCNC: 16 MG/DL (ref 8–23)
BUN/CREAT SERPL: 22.5 (ref 7–25)
CALCIUM SPEC-SCNC: 9 MG/DL (ref 8.6–10.5)
CHLORIDE SERPL-SCNC: 99 MMOL/L (ref 98–107)
CO2 SERPL-SCNC: 26.1 MMOL/L (ref 22–29)
CREAT BLD-MCNC: 0.71 MG/DL (ref 0.57–1)
GFR SERPL CREATININE-BSD FRML MDRD: 83 ML/MIN/1.73
GLUCOSE BLD-MCNC: 108 MG/DL (ref 65–99)
HCT VFR BLD AUTO: 32.1 % (ref 35.6–45.5)
HGB BLD-MCNC: 10.6 G/DL (ref 11.9–15.5)
POTASSIUM BLD-SCNC: 4.9 MMOL/L (ref 3.5–5.2)
SODIUM BLD-SCNC: 137 MMOL/L (ref 136–145)

## 2017-11-16 PROCEDURE — 94799 UNLISTED PULMONARY SVC/PX: CPT

## 2017-11-16 PROCEDURE — 25010000002 KETOROLAC TROMETHAMINE PER 15 MG: Performed by: ORTHOPAEDIC SURGERY

## 2017-11-16 PROCEDURE — 85018 HEMOGLOBIN: CPT | Performed by: ORTHOPAEDIC SURGERY

## 2017-11-16 PROCEDURE — 80048 BASIC METABOLIC PNL TOTAL CA: CPT | Performed by: ORTHOPAEDIC SURGERY

## 2017-11-16 PROCEDURE — 97150 GROUP THERAPEUTIC PROCEDURES: CPT

## 2017-11-16 PROCEDURE — 99024 POSTOP FOLLOW-UP VISIT: CPT | Performed by: NURSE PRACTITIONER

## 2017-11-16 PROCEDURE — 97110 THERAPEUTIC EXERCISES: CPT

## 2017-11-16 PROCEDURE — 85014 HEMATOCRIT: CPT | Performed by: ORTHOPAEDIC SURGERY

## 2017-11-16 RX ORDER — ONDANSETRON 4 MG/1
4 TABLET, ORALLY DISINTEGRATING ORAL EVERY 6 HOURS PRN
Qty: 20 TABLET | Refills: 2 | Status: SHIPPED | OUTPATIENT
Start: 2017-11-16 | End: 2018-01-03

## 2017-11-16 RX ORDER — PSEUDOEPHEDRINE HCL 30 MG
100 TABLET ORAL 2 TIMES DAILY
Qty: 60 CAPSULE | Refills: 2 | Status: SHIPPED | OUTPATIENT
Start: 2017-11-16 | End: 2018-01-03

## 2017-11-16 RX ORDER — KETOROLAC TROMETHAMINE 30 MG/ML
30 INJECTION, SOLUTION INTRAMUSCULAR; INTRAVENOUS ONCE
Status: COMPLETED | OUTPATIENT
Start: 2017-11-16 | End: 2017-11-16

## 2017-11-16 RX ORDER — BISACODYL 5 MG/1
10 TABLET, DELAYED RELEASE ORAL DAILY PRN
Qty: 30 TABLET | Refills: 3 | Status: SHIPPED | OUTPATIENT
Start: 2017-11-16 | End: 2018-01-03

## 2017-11-16 RX ORDER — OXYCODONE AND ACETAMINOPHEN 7.5; 325 MG/1; MG/1
TABLET ORAL
Qty: 100 TABLET | Refills: 0 | Status: SHIPPED | OUTPATIENT
Start: 2017-11-16 | End: 2017-11-28 | Stop reason: SDUPTHER

## 2017-11-16 RX ADMIN — DOCUSATE SODIUM 100 MG: 100 CAPSULE, LIQUID FILLED ORAL at 08:22

## 2017-11-16 RX ADMIN — ASPIRIN 325 MG: 325 TABLET, DELAYED RELEASE ORAL at 08:22

## 2017-11-16 RX ADMIN — ATORVASTATIN CALCIUM 80 MG: 80 TABLET, FILM COATED ORAL at 20:28

## 2017-11-16 RX ADMIN — POLYETHYLENE GLYCOL 3350 17 G: 17 POWDER, FOR SOLUTION ORAL at 08:22

## 2017-11-16 RX ADMIN — OXYCODONE HYDROCHLORIDE AND ACETAMINOPHEN 2 TABLET: 7.5; 325 TABLET ORAL at 15:21

## 2017-11-16 RX ADMIN — LISINOPRIL 10 MG: 10 TABLET ORAL at 20:28

## 2017-11-16 RX ADMIN — OXYCODONE HYDROCHLORIDE AND ACETAMINOPHEN 1 TABLET: 7.5; 325 TABLET ORAL at 10:46

## 2017-11-16 RX ADMIN — OXYCODONE HYDROCHLORIDE AND ACETAMINOPHEN 1 TABLET: 7.5; 325 TABLET ORAL at 12:13

## 2017-11-16 RX ADMIN — SERTRALINE 75 MG: 50 TABLET, FILM COATED ORAL at 08:22

## 2017-11-16 RX ADMIN — OXYCODONE HYDROCHLORIDE AND ACETAMINOPHEN 1 TABLET: 7.5; 325 TABLET ORAL at 05:36

## 2017-11-16 RX ADMIN — DOCUSATE SODIUM 100 MG: 100 CAPSULE, LIQUID FILLED ORAL at 17:21

## 2017-11-16 RX ADMIN — FLUTICASONE PROPIONATE 2 SPRAY: 50 SPRAY, METERED NASAL at 08:22

## 2017-11-16 RX ADMIN — MUPIROCIN: 20 OINTMENT TOPICAL at 08:22

## 2017-11-16 RX ADMIN — ASPIRIN 325 MG: 325 TABLET, DELAYED RELEASE ORAL at 17:21

## 2017-11-16 RX ADMIN — POLYETHYLENE GLYCOL 3350 17 G: 17 POWDER, FOR SOLUTION ORAL at 17:21

## 2017-11-16 RX ADMIN — GABAPENTIN 900 MG: 300 CAPSULE ORAL at 20:28

## 2017-11-16 RX ADMIN — OXYCODONE HYDROCHLORIDE AND ACETAMINOPHEN 2 TABLET: 7.5; 325 TABLET ORAL at 21:22

## 2017-11-16 RX ADMIN — KETOROLAC TROMETHAMINE 30 MG: 30 INJECTION, SOLUTION INTRAMUSCULAR at 21:22

## 2017-11-16 RX ADMIN — OXYCODONE HYDROCHLORIDE AND ACETAMINOPHEN 2 TABLET: 7.5; 325 TABLET ORAL at 18:24

## 2017-11-16 RX ADMIN — KETOROLAC TROMETHAMINE 30 MG: 30 INJECTION, SOLUTION INTRAMUSCULAR at 13:22

## 2017-11-16 NOTE — PLAN OF CARE
Problem: Patient Care Overview (Adult)  Goal: Plan of Care Review  Outcome: Ongoing (interventions implemented as appropriate)    11/16/17 0249   Coping/Psychosocial Response Interventions   Plan Of Care Reviewed With patient   Patient Care Overview   Progress improving   Outcome Evaluation   Outcome Summary/Follow up Plan patient ambulating to bathroom and in hallway with assistance, pain controlled at this time, patient educated on b/p monitoring       Goal: Adult Individualization and Mutuality  Outcome: Ongoing (interventions implemented as appropriate)  Goal: Discharge Needs Assessment  Outcome: Ongoing (interventions implemented as appropriate)    Problem: Perioperative Period (Adult)  Goal: Signs and Symptoms of Listed Potential Problems Will be Absent or Manageable (Perioperative Period)  Outcome: Ongoing (interventions implemented as appropriate)    Problem: Knee Replacement, Total (Adult)  Goal: Signs and Symptoms of Listed Potential Problems Will be Absent or Manageable (Knee Replacement, Total)  Outcome: Ongoing (interventions implemented as appropriate)    Problem: Fall Risk (Adult)  Goal: Absence of Falls  Outcome: Ongoing (interventions implemented as appropriate)

## 2017-11-16 NOTE — PLAN OF CARE
Problem: Patient Care Overview (Adult)  Goal: Plan of Care Review  Outcome: Ongoing (interventions implemented as appropriate)    11/16/17 1610   Coping/Psychosocial Response Interventions   Plan Of Care Reviewed With patient   Patient Care Overview   Progress improving   Outcome Evaluation   Outcome Summary/Follow up Plan Pain tolerable with PO pain meds, ambulates in room and hallway with walker and 1 assist, educated pt on bp monitoring r/t htn, pt verblized understanding,.         Problem: Perioperative Period (Adult)  Goal: Signs and Symptoms of Listed Potential Problems Will be Absent or Manageable (Perioperative Period)  Outcome: Ongoing (interventions implemented as appropriate)    Problem: Knee Replacement, Total (Adult)  Goal: Signs and Symptoms of Listed Potential Problems Will be Absent or Manageable (Knee Replacement, Total)  Outcome: Ongoing (interventions implemented as appropriate)    Problem: Fall Risk (Adult)  Goal: Absence of Falls  Outcome: Ongoing (interventions implemented as appropriate)

## 2017-11-16 NOTE — PROGRESS NOTES
Orthopedic Total Joint Progress Note        Patient: Risa Jimeenz    Date of Admission: 11/15/2017  7:10 AM    YOB: 1955    Medical Record Number: [id number]    Attending Physician: Jose Muñoz MD      POD # 1 Day Post-Op Procedure(s) (LRB):  LEFT TOTAL KNEE ARTHROPLASTY WITH SARAH NAVIGATION (Left)       Systemic or Specific Complaints: No Complaints      Allergies:   Allergies   Allergen Reactions   • Moxifloxacin Other (See Comments)     HYPERTENSION   • Penicillins Rash       Medications:   Current Medications:  Scheduled Meds:  aspirin 325 mg Oral BID   atorvastatin 80 mg Oral Nightly   docusate sodium 100 mg Oral BID   fluticasone 2 spray Nasal Daily   gabapentin 900 mg Oral Nightly   lisinopril 10 mg Oral Nightly   mupirocin  Each Nare BID   polyethylene glycol 17 g Oral BID   sertraline 75 mg Oral Daily     Continuous Infusions:  HYDROmorphone HCl-NaCl     lactated ringers 9 mL/hr Last Rate: 9 mL/hr (11/15/17 1122)   lactated ringers 100 mL/hr Last Rate: 100 mL/hr (11/15/17 1649)     PRN Meds:.•  acetaminophen  •  bisacodyl  •  bisacodyl  •  diphenhydrAMINE  •  diphenhydrAMINE  •  guaiFENesin **AND** pseudoephedrine  •  magnesium hydroxide  •  naloxone  •  ondansetron **OR** ondansetron ODT **OR** ondansetron  •  oxyCODONE-acetaminophen **OR** oxyCODONE-acetaminophen  •  promethazine      Physical Exam: 62 y.o. female Body mass index is 37.11 kg/(m^2).  General Appearance:    alert and oriented            Pain Relief: Patient reports good relief Vitals:    11/15/17 1900 11/15/17 2227 11/16/17 0300 11/16/17 0718   BP: 130/81 131/82 96/64 102/64   BP Location: Left arm Right arm Right arm Right arm   Patient Position: Lying Lying Lying Lying   Pulse: 67 62 64 74   Resp: 17 16 16 16   Temp: 97 °F (36.1 °C) 98.2 °F (36.8 °C) 97 °F (36.1 °C) 96.9 °F (36.1 °C)   TempSrc: Oral Oral Oral Oral   SpO2: 96% 99% 100% 100%   Weight:       Height:              HEENT:    Normocephalic, without  obvious abnormality, atraumatic.          PERRLA; EOMI; Neck supple with trachea midline. No JVD.       Lungs:     Respirations regular, even and unlabored      Heart:    Regular rhythm and normal rate.   Abdomen:     Normal bowel sounds, soft non-tender, non-distended       Extremities:   Operative extremity neurovascular status intact. ROM intact.    Incision intact w/out signs or symptoms of infection.  No cyanosis, no calf tenderness     Pulses:     Pulses palpable and equal bilaterally     Skin:     Skin Warm/Dry w/out cyanosis     Activity: Mobilizing Per P.T.   Weight Bearing: As Tolerated    Diagnostic Tests:   Admission on 11/15/2017   Component Date Value Ref Range Status   • Glucose 11/15/2017 113  70 - 130 mg/dL Final   • Hemoglobin 11/16/2017 10.6* 11.9 - 15.5 g/dL Final   • Hematocrit 11/16/2017 32.1* 35.6 - 45.5 % Final   • Glucose 11/16/2017 108* 65 - 99 mg/dL Final   • BUN 11/16/2017 16  8 - 23 mg/dL Final   • Creatinine 11/16/2017 0.71  0.57 - 1.00 mg/dL Final   • Sodium 11/16/2017 137  136 - 145 mmol/L Final   • Potassium 11/16/2017 4.9  3.5 - 5.2 mmol/L Final   • Chloride 11/16/2017 99  98 - 107 mmol/L Final   • CO2 11/16/2017 26.1  22.0 - 29.0 mmol/L Final   • Calcium 11/16/2017 9.0  8.6 - 10.5 mg/dL Final   • eGFR Non African Amer 11/16/2017 83  >60 mL/min/1.73 Final   • BUN/Creatinine Ratio 11/16/2017 22.5  7.0 - 25.0 Final   • Anion Gap 11/16/2017 11.9  mmol/L Final       Xr Knee 1 Or 2 View Left    Result Date: 11/15/2017  Narrative: 2 VIEWS OF THE LEFT KNEE  HISTORY: Postoperative knee pain  FINDINGS: 1. Satisfactory appearance following total knee arthroplasty, no evidence of a complication.   This report was finalized on 11/15/2017 11:21 AM by Dr. Christ Paris MD.      Xr Knee 3 View Left    Impression: Ordering physician's impression is located in the Encounter Note dated 11/10/17.       Personally viewed ortho images and report     Assessment:  Doing well POD  # 1 Day Post-Op following  total joint replacement  Acute Blood Loss Anemia, stable  Post-operative Pain  Limited mobility, requires use of walker and assistance when OOB.    Patient Active Problem List   Diagnosis   • Chronic pain of left knee   • Arthritis of left knee        Plan:  Continue to monitor labs and/or v/s, for tolerance to post op blood loss.  Continue efforts to increase mobilization.  Continue Pain Control Measures.  Continue incisional Care.  DVT prophylaxis.  Follow up in office with Jose Muñoz M.D. In 2 weeks.    Discharge Plan:POD 2-3 to home and home health    Date: 11/16/2017  CARMEN Pierre  Seen today by Jose Muñoz M.D. and CARMEN Meyer

## 2017-11-16 NOTE — THERAPY TREATMENT NOTE
Acute Care - Physical Therapy Treatment Note  [facility]     Patient Name: Risa Jimenez  : 1955  MRN: [id number]  Today's Date: 2017  Onset of Illness/Injury or Date of Surgery Date:  (POD 0 L TKA)          Admit Date: 11/15/2017    Visit Dx:    ICD-10-CM ICD-9-CM   1. Difficulty walking R26.2 719.7   2. Arthritis of left knee M19.90 716.96     Patient Active Problem List   Diagnosis   • Chronic pain of left knee   • Arthritis of left knee               Adult Rehabilitation Note       17 1523 17 0930       Rehab Assessment/Intervention    Discipline physical therapy assistant  - physical therapy assistant  -     Document Type therapy note (daily note)  - therapy note (daily note)  -     Subjective Information agree to therapy;complains of;weakness;fatigue;pain;swelling  - agree to therapy;complains of;fatigue;swelling  -     Precautions/Limitations fall precautions  - fall precautions  -     Recorded by [] Vicky Franco PTA [] Vicky Franco PTA     Pain Assessment    Pain Assessment 0-10  -JM 0-10  -JM     Pain Score 9  -JM 3  -JM     Post Pain Score 7  -JM      Pain Type Surgical pain  -JM Surgical pain  -JM     Pain Location Knee  -JM Knee  -JM     Pain Orientation Left  -JM Left  -JM     Pain Intervention(s) Medication (See MAR);Repositioned;Cold applied  - Medication (See MAR);Repositioned;Cold applied  -     Response to Interventions lionel  -JM lionel  -JM     Recorded by [JM] Vicky Franco PTA [JM] Vicky Franco PTA     ROM (Range of Motion)    General ROM Detail  -6108  -JM     Recorded by  [SABINA] Vicky Franco PTA     Bed Mobility, Assessment/Treatment    Bed Mobility, Scoot/Bridge, [place] conditional independence  - conditional independence  -     Bed Mobility, Comment  in chair  -     Recorded by [SABINA] Vicky Franco PTA [JM] Vicky Franco PTA     Transfer Assessment/Treatment    Transfers, Sit-Stand [place] stand by  assist;verbal cues required  -JM stand by assist;verbal cues required  -JM     Transfers, Stand-Sit [place] stand by assist;verbal cues required  -JM stand by assist;verbal cues required  -JM     Transfers, Sit-Stand-Sit, Assist Device rolling walker  -JM rolling walker  -JM     Transfer, Comment  slightly impulsive  -JM     Recorded by [SABINA] Vicky Franco PTA [SABINA] Vicky Franco PTA     Gait Assessment/Treatment    Gait, [place] Level contact guard assist;stand by assist;verbal cues required  -JM contact guard assist;stand by assist;verbal cues required  -JM     Gait, Assistive Device rolling walker  -JM rolling walker  -JM     Gait, Distance (Feet) 150  -  -JM     Gait, Safety Issues  step length decreased  -     Gait, Comment  fast-paced; slowed w/cues  -JM     Recorded by [SABINA] Vicky Franco PTA [JM] Vicky Franco PTA     Stairs Assessment/Treatment    Stairs, Comment pt declined due to incr pain-try in am prior to d/c  -JM try in pm  -     Recorded by [SABINA] Vicky Franco PTA [JM] Vicky Franco PTA     Therapy Exercises    Exercise Protocols total knee  - total knee  -     Total Knee Exercises left:;completed protocol;20 reps   cues for each due to falling asleep   - left:;15 reps;completed protocol  -     Recorded by [SABINA] Vicky Franco PTA [JM] Vicky Franco PTA     Positioning and Restraints    Pre-Treatment Position sitting in chair/recliner  - sitting in chair/recliner  -     Post Treatment Position bathroom  -JM      In Chair  reclined;call light within reach;encouraged to call for assist;notified nsg  -     Bathroom sitting;call light within reach;encouraged to call for assist;with family/caregiver  -      Recorded by [SABINA] Vicky Franco PTA [JM] Vicky Franco PTA       User Key  (r) = Recorded By, (t) = Taken By, (c) = Cosigned By    Initials Name Effective Dates    SABINA Franco PTA 02/18/16 -                 IP PT Goals       11/15/17  1509          Bed Mobility PT LTG    Bed Mobility PT LTG, Date Established 11/15/17  -MG      Bed Mobility PT LTG, Time to Achieve 4 days  -MG      Bed Mobility PT LTG, Activity Type all bed mobility  -MG      Bed Mobility PT LTG, [place] Level independent  -MG      Transfer Training PT LTG    Transfer Training PT LTG, Date Established 11/15/17  -MG      Transfer Training PT LTG, Time to Achieve 4 days  -MG      Transfer Training PT LTG, Activity Type all transfers  -MG      Transfer Training PT LTG, [place] Level supervision required  -MG      Transfer Training PT LTG, Assist Device walker, rolling  -MG      Gait Training PT LTG    Gait Training Goal PT LTG, Date Established 11/15/17  -MG      Gait Training Goal PT LTG, Time to Achieve 4 days  -MG      Gait Training Goal PT LTG, [place] Level supervision required  -MG      Gait Training Goal PT LTG, Assist Device walker, rolling  -MG      Gait Training Goal PT LTG, Distance to Achieve 150  -MG      Stair Training PT LTG    Stair Training Goal PT LTG, Date Established 11/15/17  -MG      Stair Training Goal PT LTG, Time to Achieve 4 days  -MG      Stair Training Goal PT LTG, Number of Steps 2  -MG      Stair Training Goal PT LTG, [place] Level supervision required  -MG      Stair Training Goal PT LTG, Assist Device 1 handrail  -MG      Patient Education PT LTG    Patient Education PT LTG, Date Established 11/15/17  -MG      Patient Education PT LTG, Time to Achieve 4 days  -MG      Patient Education PT LTG, Education Type HEP  -MG      Patient Education PT LTG, Education Understanding demonstrate adequately;verbalize understanding  -MG        User Key  (r) = Recorded By, (t) = Taken By, (c) = Cosigned By    Initials Name Provider Type    MG Megan Gosselin, PT Physical Therapist          Physical Therapy Education     Title: PT OT SLP Therapies (Done)     Topic: Physical Therapy (Done)     Point: Mobility training (Done)    Learning Progress  Summary    Learner Readiness Method Response Comment Documented by Status   Patient Eager E,TB,D VU,NR   11/16/17 1522 Done    Acceptance E VU,NR  MG 11/15/17 1509 Done               Point: Home exercise program (Done)    Learning Progress Summary    Learner Readiness Method Response Comment Documented by Status   Patient Eager E,TB,D VU,NR   11/16/17 1522 Done    Acceptance E VU,NR  MG 11/15/17 1509 Done               Point: Body mechanics (Done)    Learning Progress Summary    Learner Readiness Method Response Comment Documented by Status   Patient Eager E,TB,D VU,NR   11/16/17 1522 Done    Acceptance E VU,NR  MG 11/15/17 1509 Done               Point: Precautions (Done)    Learning Progress Summary    Learner Readiness Method Response Comment Documented by Status   Patient Eager E,TB,D VU,NR   11/16/17 1522 Done    Acceptance E VU,NR  MG 11/15/17 1509 Done                      User Key     Initials Effective Dates Name Provider Type Discipline     02/18/16 -  Vicky Franco, PTA Physical Therapy Assistant PT     09/13/17 -  Megan Gosselin, PT Physical Therapist PT                    PT Recommendation and Plan  Anticipated Equipment Needs At Discharge:  (has equipment )  Anticipated Discharge Disposition: home with home health  Planned Therapy Interventions: balance training, bed mobility training, gait training, home exercise program, ROM (Range of Motion), patient/family education, stair training, strengthening, transfer training  PT Frequency: 2 times/day  Plan of Care Review  Plan Of Care Reviewed With: patient  Progress: improving  Outcome Summary/Follow up Plan: incr amb dist, excellent ROM; pt denies numbness          Outcome Measures       11/16/17 1500 11/15/17 1500       How much help from another person do you currently need...    Turning from your back to your side while in flat bed without using bedrails? 4  -JM 3  -MG     Moving from lying on back to sitting on the side of a flat bed  without bedrails? 4  -JM 3  -MG     Moving to and from a bed to a chair (including a wheelchair)? 3  -JM 3  -MG     Standing up from a chair using your arms (e.g., wheelchair, bedside chair)? 4  -JM 3  -MG     Climbing 3-5 steps with a railing? 3  -JM 2  -MG     To walk in hospital room? 3  -JM 3  -MG     AM-PAC 6 Clicks Score 21  -JM 17  -MG     Functional Assessment    Outcome Measure Options  AM-PAC 6 Clicks Basic Mobility (PT)  -MG       User Key  (r) = Recorded By, (t) = Taken By, (c) = Cosigned By    Initials Name Provider Type    SABINA Franco PTA Physical Therapy Assistant    MG Megan Gosselin, PT Physical Therapist           Time Calculation:         PT Charges       11/16/17 1526 11/16/17 1522       Time Calculation    Start Time 1320  - 0920  -     Stop Time 1408  - 1015  -     Time Calculation (min) 48 min  - 55 min  -     PT Received On 11/16/17  - 11/16/17  -SABINA     PT - Next Appointment 11/17/17  - 11/16/17  -       User Key  (r) = Recorded By, (t) = Taken By, (c) = Cosigned By    Initials Name Provider Type    SABINA Franco PTA Physical Therapy Assistant          Therapy Charges for Today     Code Description Service Date Service Provider Modifiers Qty    56875393399 HC PT THER PROC EA 15 MIN 11/16/2017 Vicky Franco PTA GP 1    0195397 HC PT THER PROC GROUP 11/16/2017 Vicky Franco PTA GP 1    15484927325 HC PT THER PROC EA 15 MIN 11/16/2017 Vicky Franco PTA GP 1    39889331604 HC PT THER PROC GROUP 11/16/2017 Vicky Franco PTA GP 1          PT G-Codes  Outcome Measure Options: AM-PAC 6 Clicks Basic Mobility (PT)    Vicky Franco PTA  11/16/2017

## 2017-11-16 NOTE — PLAN OF CARE
Problem: Patient Care Overview (Adult)  Goal: Plan of Care Review  Outcome: Ongoing (interventions implemented as appropriate)    11/16/17 1521   Coping/Psychosocial Response Interventions   Plan Of Care Reviewed With patient   Patient Care Overview   Progress improving   Outcome Evaluation   Outcome Summary/Follow up Plan incr amb dist, excellent ROM; pt denies numbness

## 2017-11-16 NOTE — THERAPY TREATMENT NOTE
Acute Care - Physical Therapy Treatment Note  [facility]     Patient Name: Risa Jimenez  : 1955  MRN: [id number]  Today's Date: 2017  Onset of Illness/Injury or Date of Surgery Date:  (POD 0 L TKA)          Admit Date: 11/15/2017    Visit Dx:    ICD-10-CM ICD-9-CM   1. Difficulty walking R26.2 719.7   2. Arthritis of left knee M19.90 716.96     Patient Active Problem List   Diagnosis   • Chronic pain of left knee   • Arthritis of left knee               Adult Rehabilitation Note       17 0930          Rehab Assessment/Intervention    Discipline physical therapy assistant  -      Document Type therapy note (daily note)  -      Subjective Information agree to therapy;complains of;fatigue;swelling  -      Precautions/Limitations fall precautions  -      Recorded by [] Vicky Franco PTA      Pain Assessment    Pain Assessment 0-10  -      Pain Score 3  -      Pain Type Surgical pain  -      Pain Location Knee  -      Pain Orientation Left  -      Pain Intervention(s) Medication (See MAR);Repositioned;Cold applied  -      Response to Interventions lionel  -      Recorded by [SABINA] Vicky Franco PTA      ROM (Range of Motion)    General ROM Detail -6-108  -      Recorded by [SABINA] Vicky Franco PTA      Bed Mobility, Assessment/Treatment    Bed Mobility, Scoot/Bridge, [place] conditional independence  -      Bed Mobility, Comment in chair  -      Recorded by [SABINA] Vicky Franco PTA      Transfer Assessment/Treatment    Transfers, Sit-Stand [place] stand by assist;verbal cues required  -      Transfers, Stand-Sit [place] stand by assist;verbal cues required  -      Transfers, Sit-Stand-Sit, Assist Device rolling walker  -      Transfer, Comment slightly impulsive  -      Recorded by [JM] Vicky Franco PTA      Gait Assessment/Treatment    Gait, [place] Level contact guard assist;stand by assist;verbal cues required  -      Gait,  Assistive Device rolling walker  -      Gait, Distance (Feet) 150  -JM      Gait, Safety Issues step length decreased  -      Gait, Comment fast-paced; slowed w/cues  -JM      Recorded by [SABINA] Vicky Franco PTA      Stairs Assessment/Treatment    Stairs, Comment try in pm  -JM      Recorded by [SABINA] Vicky Franco PTA      Therapy Exercises    Exercise Protocols total knee  -      Total Knee Exercises left:;15 reps;completed protocol  -      Recorded by [SABINA] Vicky Franco PTA      Positioning and Restraints    Pre-Treatment Position sitting in chair/recliner  -      In Chair reclined;call light within reach;encouraged to call for assist;notified nsg  -JM      Recorded by [SABINA] Vicky Franco PTA        User Key  (r) = Recorded By, (t) = Taken By, (c) = Cosigned By    Initials Name Effective Dates    SABINA Franco PTA 02/18/16 -                 IP PT Goals       11/15/17 1509          Bed Mobility PT LTG    Bed Mobility PT LTG, Date Established 11/15/17  -MG      Bed Mobility PT LTG, Time to Achieve 4 days  -MG      Bed Mobility PT LTG, Activity Type all bed mobility  -MG      Bed Mobility PT LTG, [place] Level independent  -MG      Transfer Training PT LTG    Transfer Training PT LTG, Date Established 11/15/17  -MG      Transfer Training PT LTG, Time to Achieve 4 days  -MG      Transfer Training PT LTG, Activity Type all transfers  -MG      Transfer Training PT LTG, [place] Level supervision required  -MG      Transfer Training PT LTG, Assist Device walker, rolling  -MG      Gait Training PT LTG    Gait Training Goal PT LTG, Date Established 11/15/17  -MG      Gait Training Goal PT LTG, Time to Achieve 4 days  -MG      Gait Training Goal PT LTG, [place] Level supervision required  -MG      Gait Training Goal PT LTG, Assist Device walker, rolling  -MG      Gait Training Goal PT LTG, Distance to Achieve 150  -MG      Stair Training PT LTG    Stair Training Goal PT LTG, Date  Established 11/15/17  -MG      Stair Training Goal PT LTG, Time to Achieve 4 days  -MG      Stair Training Goal PT LTG, Number of Steps 2  -MG      Stair Training Goal PT LTG, [place] Level supervision required  -MG      Stair Training Goal PT LTG, Assist Device 1 handrail  -MG      Patient Education PT LTG    Patient Education PT LTG, Date Established 11/15/17  -MG      Patient Education PT LTG, Time to Achieve 4 days  -MG      Patient Education PT LTG, Education Type HEP  -MG      Patient Education PT LTG, Education Understanding demonstrate adequately;verbalize understanding  -MG        User Key  (r) = Recorded By, (t) = Taken By, (c) = Cosigned By    Initials Name Provider Type    MG Megan Gosselin, PT Physical Therapist          Physical Therapy Education     Title: PT OT SLP Therapies (Done)     Topic: Physical Therapy (Done)     Point: Mobility training (Done)    Learning Progress Summary    Learner Readiness Method Response Comment Documented by Status   Patient Eager E,PEPPER ARAUJO VUNR   11/16/17 1522 Done    Acceptance E VU,NR  MG 11/15/17 1509 Done               Point: Home exercise program (Done)    Learning Progress Summary    Learner Readiness Method Response Comment Documented by Status   Patient Eager E,TBPEPPER VUNR   11/16/17 1522 Done    Acceptance E VU,NR  MG 11/15/17 1509 Done               Point: Body mechanics (Done)    Learning Progress Summary    Learner Readiness Method Response Comment Documented by Status   Patient Eager E,TBPEPPER VU,NR   11/16/17 1522 Done    Acceptance E VU,NR  MG 11/15/17 1509 Done               Point: Precautions (Done)    Learning Progress Summary    Learner Readiness Method Response Comment Documented by Status   Patient Eager E,TBPEPPER VU,NR   11/16/17 1522 Done    Acceptance E VU,NR  MG 11/15/17 1509 Done                      User Key     Initials Effective Dates Name Provider Type Discipline     02/18/16 -  Vicky Franco PTA Physical Therapy Assistant PT    MG  09/13/17 -  Megan Gosselin, PT Physical Therapist PT                    PT Recommendation and Plan  Anticipated Equipment Needs At Discharge:  (has equipment )  Anticipated Discharge Disposition: home with home health  Planned Therapy Interventions: balance training, bed mobility training, gait training, home exercise program, ROM (Range of Motion), patient/family education, stair training, strengthening, transfer training  PT Frequency: 2 times/day  Plan of Care Review  Plan Of Care Reviewed With: patient  Progress: improving  Outcome Summary/Follow up Plan: incr amb dist, excellent ROM; pt denies numbness          Outcome Measures       11/16/17 1500 11/15/17 1500       How much help from another person do you currently need...    Turning from your back to your side while in flat bed without using bedrails? 4  -JM 3  -MG     Moving from lying on back to sitting on the side of a flat bed without bedrails? 4  - 3  -MG     Moving to and from a bed to a chair (including a wheelchair)? 3  -JM 3  -MG     Standing up from a chair using your arms (e.g., wheelchair, bedside chair)? 4  - 3  -MG     Climbing 3-5 steps with a railing? 3  -JM 2  -MG     To walk in hospital room? 3  -JM 3  -MG     AM-PAC 6 Clicks Score 21  - 17  -MG     Functional Assessment    Outcome Measure Options  AM-PAC 6 Clicks Basic Mobility (PT)  -       User Key  (r) = Recorded By, (t) = Taken By, (c) = Cosigned By    Initials Name Provider Type    SABINA Franco PTA Physical Therapy Assistant     Megan Gosselin, PT Physical Therapist           Time Calculation:         PT Charges       11/16/17 1522          Time Calculation    Start Time 0920  -      Stop Time 1015  -      Time Calculation (min) 55 min  -      PT Received On 11/16/17  -      PT - Next Appointment 11/16/17  -        User Key  (r) = Recorded By, (t) = Taken By, (c) = Cosigned By    Initials Name Provider Type    SABINA Franco PTA Physical Therapy  Assistant          Therapy Charges for Today     Code Description Service Date Service Provider Modifiers Qty    81141214961 HC PT THER PROC EA 15 MIN 11/16/2017 Vicky Franco, PTA GP 1    74079542391 HC PT THER PROC GROUP 11/16/2017 Vicky Franco PTA GP 1          PT G-Codes  Outcome Measure Options: AM-PAC 6 Clicks Basic Mobility (PT)    Vicky Franco, TIGRE  11/16/2017

## 2017-11-17 VITALS
WEIGHT: 190 LBS | OXYGEN SATURATION: 92 % | SYSTOLIC BLOOD PRESSURE: 98 MMHG | DIASTOLIC BLOOD PRESSURE: 62 MMHG | RESPIRATION RATE: 16 BRPM | BODY MASS INDEX: 37.3 KG/M2 | TEMPERATURE: 97 F | HEART RATE: 71 BPM | HEIGHT: 60 IN

## 2017-11-17 LAB
HCT VFR BLD AUTO: 31.4 % (ref 35.6–45.5)
HGB BLD-MCNC: 10.1 G/DL (ref 11.9–15.5)

## 2017-11-17 PROCEDURE — 85018 HEMOGLOBIN: CPT | Performed by: ORTHOPAEDIC SURGERY

## 2017-11-17 PROCEDURE — 85014 HEMATOCRIT: CPT | Performed by: ORTHOPAEDIC SURGERY

## 2017-11-17 PROCEDURE — 94799 UNLISTED PULMONARY SVC/PX: CPT

## 2017-11-17 PROCEDURE — 99024 POSTOP FOLLOW-UP VISIT: CPT | Performed by: NURSE PRACTITIONER

## 2017-11-17 RX ADMIN — OXYCODONE HYDROCHLORIDE AND ACETAMINOPHEN 2 TABLET: 7.5; 325 TABLET ORAL at 09:53

## 2017-11-17 RX ADMIN — FLUTICASONE PROPIONATE 2 SPRAY: 50 SPRAY, METERED NASAL at 08:00

## 2017-11-17 RX ADMIN — OXYCODONE HYDROCHLORIDE AND ACETAMINOPHEN 2 TABLET: 7.5; 325 TABLET ORAL at 06:39

## 2017-11-17 RX ADMIN — ASPIRIN 325 MG: 325 TABLET, DELAYED RELEASE ORAL at 08:00

## 2017-11-17 RX ADMIN — OXYCODONE HYDROCHLORIDE AND ACETAMINOPHEN 2 TABLET: 7.5; 325 TABLET ORAL at 00:24

## 2017-11-17 RX ADMIN — DOCUSATE SODIUM 100 MG: 100 CAPSULE, LIQUID FILLED ORAL at 08:00

## 2017-11-17 RX ADMIN — POLYETHYLENE GLYCOL 3350 17 G: 17 POWDER, FOR SOLUTION ORAL at 08:00

## 2017-11-17 RX ADMIN — SERTRALINE 75 MG: 50 TABLET, FILM COATED ORAL at 08:00

## 2017-11-17 RX ADMIN — MUPIROCIN: 20 OINTMENT TOPICAL at 08:00

## 2017-11-17 RX ADMIN — OXYCODONE HYDROCHLORIDE AND ACETAMINOPHEN 2 TABLET: 7.5; 325 TABLET ORAL at 03:30

## 2017-11-17 NOTE — PLAN OF CARE
Problem: Patient Care Overview (Adult)  Goal: Plan of Care Review  Outcome: Ongoing (interventions implemented as appropriate)    11/17/17 0237   Coping/Psychosocial Response Interventions   Plan Of Care Reviewed With patient   Patient Care Overview   Progress improving   Outcome Evaluation   Outcome Summary/Follow up Plan patient ambulating with assistance to bathroom, pain intolerable at start of shift, patient states toradol is only thing that helps with pain, patient educated on b/p monitoring       Goal: Adult Individualization and Mutuality  Outcome: Ongoing (interventions implemented as appropriate)  Goal: Discharge Needs Assessment  Outcome: Ongoing (interventions implemented as appropriate)    Problem: Knee Replacement, Total (Adult)  Goal: Signs and Symptoms of Listed Potential Problems Will be Absent or Manageable (Knee Replacement, Total)  Outcome: Ongoing (interventions implemented as appropriate)    Problem: Fall Risk (Adult)  Goal: Absence of Falls  Outcome: Ongoing (interventions implemented as appropriate)

## 2017-11-17 NOTE — PROGRESS NOTES
Orthopedic Total Joint Progress Note        Patient: Risa Jimenez    Date of Admission: 11/15/2017  7:10 AM    YOB: 1955    Medical Record Number: [id number]    Attending Physician: Jose Muñoz MD      POD # 2 Days Post-Op Procedure(s) (LRB):  LEFT TOTAL KNEE ARTHROPLASTY WITH SARAH NAVIGATION (Left)       Systemic or Specific Complaints: No Complaints and had pain flare last evening when block wore off but states is doing better this morning after toradol.       Allergies:   Allergies   Allergen Reactions   • Moxifloxacin Other (See Comments)     HYPERTENSION   • Penicillins Rash       Medications:   Current Medications:  Scheduled Meds:  aspirin 325 mg Oral BID   atorvastatin 80 mg Oral Nightly   docusate sodium 100 mg Oral BID   fluticasone 2 spray Nasal Daily   gabapentin 900 mg Oral Nightly   lisinopril 10 mg Oral Nightly   mupirocin  Each Nare BID   polyethylene glycol 17 g Oral BID   sertraline 75 mg Oral Daily     Continuous Infusions:  HYDROmorphone HCl-NaCl     lactated ringers 9 mL/hr Last Rate: 9 mL/hr (11/15/17 1122)     PRN Meds:.•  acetaminophen  •  bisacodyl  •  bisacodyl  •  diphenhydrAMINE  •  diphenhydrAMINE  •  guaiFENesin **AND** pseudoephedrine  •  magnesium hydroxide  •  naloxone  •  ondansetron **OR** ondansetron ODT **OR** ondansetron  •  oxyCODONE-acetaminophen **OR** oxyCODONE-acetaminophen  •  promethazine      Physical Exam: 62 y.o. female Body mass index is 37.11 kg/(m^2).  General Appearance:    alert and oriented            Pain Relief: Patient reports good relief Vitals:    11/16/17 1900 11/16/17 2300 11/17/17 0329 11/17/17 0700   BP: 108/65 104/63 103/63 98/62   BP Location: Left arm Left arm Right arm Left arm   Patient Position: Lying Lying Lying Sitting   Pulse: 73 80 72 71   Resp: 16 16 16 16   Temp: 98.4 °F (36.9 °C) 97.6 °F (36.4 °C) 97.6 °F (36.4 °C) 97 °F (36.1 °C)   TempSrc: Oral Oral Oral Oral   SpO2: 98% 96% 92%    Weight:       Height:               HEENT:    Normocephalic, without obvious abnormality, atraumatic.          PERRLA; EOMI; Neck supple with trachea midline. No JVD.       Lungs:     Respirations regular, even and unlabored      Heart:    Regular rhythm and normal rate.   Abdomen:     Normal bowel sounds, soft non-tender, non-distended       Extremities:   Operative extremity neurovascular status intact. ROM intact.    Incision intact w/out signs or symptoms of infection.  No cyanosis, no calf tenderness     Pulses:     Pulses palpable and equal bilaterally     Skin:     Skin Warm/Dry w/out cyanosis     Activity: Mobilizing Per P.T.   Weight Bearing: As Tolerated    Diagnostic Tests:   Admission on 11/15/2017   Component Date Value Ref Range Status   • Glucose 11/15/2017 113  70 - 130 mg/dL Final   • Hemoglobin 11/16/2017 10.6* 11.9 - 15.5 g/dL Final   • Hematocrit 11/16/2017 32.1* 35.6 - 45.5 % Final   • Glucose 11/16/2017 108* 65 - 99 mg/dL Final   • BUN 11/16/2017 16  8 - 23 mg/dL Final   • Creatinine 11/16/2017 0.71  0.57 - 1.00 mg/dL Final   • Sodium 11/16/2017 137  136 - 145 mmol/L Final   • Potassium 11/16/2017 4.9  3.5 - 5.2 mmol/L Final   • Chloride 11/16/2017 99  98 - 107 mmol/L Final   • CO2 11/16/2017 26.1  22.0 - 29.0 mmol/L Final   • Calcium 11/16/2017 9.0  8.6 - 10.5 mg/dL Final   • eGFR Non African Amer 11/16/2017 83  >60 mL/min/1.73 Final   • BUN/Creatinine Ratio 11/16/2017 22.5  7.0 - 25.0 Final   • Anion Gap 11/16/2017 11.9  mmol/L Final   • Hemoglobin 11/17/2017 10.1* 11.9 - 15.5 g/dL Final   • Hematocrit 11/17/2017 31.4* 35.6 - 45.5 % Final       Xr Knee 1 Or 2 View Left    Result Date: 11/15/2017  Narrative: 2 VIEWS OF THE LEFT KNEE  HISTORY: Postoperative knee pain  FINDINGS: 1. Satisfactory appearance following total knee arthroplasty, no evidence of a complication.   This report was finalized on 11/15/2017 11:21 AM by Dr. Christ Paris MD.      Xr Knee 3 View Left    Impression: Ordering physician's impression is located  in the Encounter Note dated 11/10/17.       Personally viewed ortho images and report     Assessment:  Doing well POD  # 2 Days Post-Op following total joint replacement  Acute Blood Loss Anemia, stable  Post-operative Pain  Limited mobility, requires use of walker and assistance when OOB.    Patient Active Problem List   Diagnosis   • Chronic pain of left knee   • Arthritis of left knee        Plan:  Continue to monitor labs and/or v/s, for tolerance to post op blood loss.  Continue efforts to increase mobilization.  Continue Pain Control Measures.  Continue incisional Care.  DVT prophylaxis.  Follow up in office with Jose Muñoz M.D. In 2 weeks.    Discharge Plan:today to home and home health    Date: 11/17/2017  Amada Munguia, APRN

## 2017-11-17 NOTE — PROGRESS NOTES
Continued Stay Note  [facility]     Patient Name: Risa Jimenez  MRN: [id number]  Today's Date: 11/17/2017    Admit Date: 11/15/2017          Discharge Plan       11/17/17 1011    Case Management/Social Work Plan    Additional Comments Spoke with patient and she confirms that plan is go to [facility] with [facility] to follow.  Veronica/[facility] notified of dc.               Discharge Codes     None        Expected Discharge Date and Time     Expected Discharge Date Expected Discharge Time    Nov 17, 2017             Sheri Brice RN

## 2017-11-17 NOTE — PAYOR COMM NOTE
"Risa Mora (62 y.o. Female)     Date of Birth Social Security Number Address Home Phone MRN    1955  [id number] [address] [phone] [id number]    Jewish Marital Status          Episcopalian Single       Admission Date Admission Type Admitting Provider Attending Provider Department, Room/Bed    11/15/17 Elective Jose Muñoz MD  [facility] [address], P776/1    Discharge Date Discharge Disposition Discharge Destination        11/17/2017 [place] [facility]             Attending Provider: (none)    Allergies:  Moxifloxacin, Penicillins    Isolation:  None   Infection:  None   Code Status:  FULL    Ht:  60\" (152.4 cm)   Wt:  190 lb (86.2 kg)    Admission Cmt:  None   Principal Problem:  Arthritis of left knee [M17.12]                 Active Insurance as of 11/15/2017     Primary Coverage     Payor Plan Insurance Group Employer/Plan Group    [facility] BLUE [place]OSS [place] [id number]     Payor Plan Address Payor Plan Phone Number Effective From Effective To    [address]  9/23[address]2001     [place] [address]       Subscriber Name Subscriber Birth Date Member ID       RISA MORA 1955 [id number]                 Emergency Contacts      (Rel.) Home Phone Work Phone Mobile Phone    Fabi Mora (Sister) -- -- [phone]               Discharge Summary      Jose Muñoz MD at 11/17/2017  8:57 AM           Orthopedic Discharge Summary      Patient: Risa Mora  YOB: 1955  Medical Record Number: [id number]    Attending Physician: Jose Muñoz MD  Consulting Physician(s):  none    Date of Admission: 11/15/2017  7:10 AM  Date of Discharge: 11/17/17    Discharge Diagnosis: LEFT TOTAL KNEE ARTHROPLASTY WITH SARAH NAVIGATION,   Acute Blood Loss Anemia, stable  Post-operative Pain  Limited mobility, requires use of walker and assistance when OOB.    Presenting Problem/History of Present Illness: Arthritis of left knee [M17.12]  Arthritis of left " knee [M17.12]        Allergies:   Allergies   Allergen Reactions   • Moxifloxacin Other (See Comments)     HYPERTENSION   • Penicillins Rash       Discharge Medications     Risa Jimenez   Home Medication Instructions GEOFFREY:[id number]    Printed on:11/17/17 8263   Medication Information                      aspirin  MG EC tablet  Take 1 tablet by mouth 2 (Two) Times a Day for 82 doses.             atorvastatin (LIPITOR) 80 MG tablet  Take 80 mg by mouth Every Night.             bisacodyl (DULCOLAX) 5 MG EC tablet  Take 2 tablets by mouth Daily As Needed for Constipation.             Cholecalciferol (VITAMIN D) 2000 units tablet  Take 2,000 Units by mouth Daily. HOLD PRIOR TO SURG             docusate sodium 100 MG capsule  Take 100 mg by mouth 2 (Two) Times a Day.             fluticasone (FLONASE) 50 MCG/ACT nasal spray  2 sprays into each nostril Daily.             gabapentin (NEURONTIN) 300 MG capsule  Take 900 mg by mouth Every Night.             lisinopril (PRINIVIL,ZESTRIL) 10 MG tablet  Take 10 mg by mouth Every Night.             ondansetron ODT (ZOFRAN-ODT) 4 MG disintegrating tablet  Take 1 tablet by mouth Every 6 (Six) Hours As Needed for Nausea or Vomiting.             oxyCODONE-acetaminophen (PERCOCET) 7.5-325 MG per tablet  1-2 tabs po q 3-4 hr prn severe pain, wean as tolerated             polyethylene glycol (MIRALAX) pack packet  Take 17 g by mouth 2 (Two) Times a Day.             pseudoephedrine-guaifenesin (MUCINEX D)  MG per 12 hr tablet  Take 1 tablet by mouth 2 (Two) Times a Day As Needed.             sertraline (ZOLOFT) 50 MG tablet  Take 75 mg by mouth Daily.                   Past Medical History:   Diagnosis Date   • Arthritis    • High cholesterol    • Hypertension    • Left knee pain    • Left leg pain    • Sciatic leg pain     LEFT        Past Surgical History:   Procedure Laterality Date   • BACK SURGERY  2012    Lumbar   • BREAST BIOPSY     • CHOLECYSTECTOMY     • JOINT  REPLACEMENT Right 2015   • SINUS SURGERY     • TOTAL KNEE ARTHROPLASTY Left 11/15/2017    Procedure: LEFT TOTAL KNEE ARTHROPLASTY WITH SARAH NAVIGATION;  Surgeon: Jose Muñoz MD;  Location: [facility] OR;  Service:         Social History     Occupational History   • Not on file.     Social History Main Topics   • Smoking status: Never Smoker   • Smokeless tobacco: Never Used   • Alcohol use Yes      Comment: OCCASIONALLY   • Drug use: No   • Sexual activity: Defer    Social History     Social History Narrative        Family History   Problem Relation Age of Onset   • Hypertension Sister    • Malig Hyperthermia Neg Hx          Physical Exam: 62 y.o. female Body mass index is 37.11 kg/(m^2).    General Appearance:    Alert, cooperative, in no acute distress                    Vitals:    11/16/17 1900 11/16/17 2300 11/17/17 0329 11/17/17 0700   BP: 108/65 104/63 103/63 98/62   BP Location: Left arm Left arm Right arm Left arm   Patient Position: Lying Lying Lying Sitting   Pulse: 73 80 72 71   Resp: 16 16 16 16   Temp: 98.4 °F (36.9 °C) 97.6 °F (36.4 °C) 97.6 °F (36.4 °C) 97 °F (36.1 °C)   TempSrc: Oral Oral Oral Oral   SpO2: 98% 96% 92%    Weight:       Height:            DIAGNOSTIC TESTS:   Admission on 11/15/2017   Component Date Value Ref Range Status   • Glucose 11/15/2017 113  70 - 130 mg/dL Final   • Hemoglobin 11/16/2017 10.6* 11.9 - 15.5 g/dL Final   • Hematocrit 11/16/2017 32.1* 35.6 - 45.5 % Final   • Glucose 11/16/2017 108* 65 - 99 mg/dL Final   • BUN 11/16/2017 16  8 - 23 mg/dL Final   • Creatinine 11/16/2017 0.71  0.57 - 1.00 mg/dL Final   • Sodium 11/16/2017 137  136 - 145 mmol/L Final   • Potassium 11/16/2017 4.9  3.5 - 5.2 mmol/L Final   • Chloride 11/16/2017 99  98 - 107 mmol/L Final   • CO2 11/16/2017 26.1  22.0 - 29.0 mmol/L Final   • Calcium 11/16/2017 9.0  8.6 - 10.5 mg/dL Final   • eGFR Non African Amer 11/16/2017 83  >60 mL/min/1.73 Final   • BUN/Creatinine Ratio 11/16/2017 22.5  7.0 -  25.0 Final   • Anion Gap 11/16/2017 11.9  mmol/L Final   • Hemoglobin 11/17/2017 10.1* 11.9 - 15.5 g/dL Final   • Hematocrit 11/17/2017 31.4* 35.6 - 45.5 % Final       Xr Knee 1 Or 2 View Left    Result Date: 11/15/2017  Narrative: 2 VIEWS OF THE LEFT KNEE  HISTORY: Postoperative knee pain  FINDINGS: 1. Satisfactory appearance following total knee arthroplasty, no evidence of a complication.   This report was finalized on 11/15/2017 11:21 AM by Dr. Christ Paris MD.      Xr Knee 3 View Left    Impression: Ordering physician's impression is located in the Encounter Note dated 11/10/17.       Hospital Course:  62 y.o. female admitted to [facility] to services of Jose Muñoz MD with Arthritis of left knee [M17.12]  Arthritis of left knee [M17.12] on 11/15/2017 and underwent LEFT TOTAL KNEE ARTHROPLASTY WITH SARAH NAVIGATION  Per Jose Muñoz MD. Antibiotic and VTE prophylaxis were per SCIP protocols. Post-operatively the patient transferred to the post-operative floor where the patient underwent mobilization therapy that included active as well as passive ROM exercises. Opioids were titrated to achieve appropriate pain management to allow for participation in mobilization exercises. Vital signs are now stable. On the day of discharge the wound was clean, dry and intact and calf was soft and non tender and Homans sign was negative. Operative extremity neurovascular status remains intact.   Appropriate education re: incision care, activity levels, medications, and follow up visits was completed and all questions were answered. The patient is now deemed stable for discharge.    Condition on Discharge:  Stable    Discharge Instructions: Patient is to continue with physical therapy exercises twice daily and continue working with the physical therapist as ordered. Patient may weight bear as tolerated unless otherwise specified. Continue GRETA hose daily (for six weeks) and ice regularly. Patient instructed on  frequent calf pumping exercises.  Patient also instructed on incentive spirometer during hospitalization and encouraged to continue to use at home regularly.     For Total Knee Replacement: May shower on POD#5.  The wound should be gently cleaned with antibacterial soap then dried and covered with a dry sterile dressing. The wound should be covered at all times except while showering until all drainage has stopped.  Patient may change dressings daily and prn using sterile 4x4 and paper tape, and should call if any unusual drainage, redness or swelling. Staples will be removed in the office in 2 weeks.    Follow up Instructions:  Follow up in the office with Dr. Jose Artis in 2 weeks - patient to call the office at [phone] to schedule. Prescriptions were given for pain medication and blood thinner therapy.    Follow-up Appointments  Future Appointments  Date Time Provider Department Center   11/28/2017 3:20 PM CARMEN Escobar [facility] None     Additional Instructions for the Follow-ups that You Need to Schedule     Ambulatory Referral to Home Health    As directed    Face to Face Visit Date:  11/15/2017   Follow-up Provider for Plan of Care?:  I will be treating the patient on an ongoing basis.  Please send me the Plan of Care for signature.   Follow-up Provider:  JOSE ARTIS   Reason/Clinical Findings:  post operative pain with ambulation, requires use of a walker for ambulation   Describe mobility limitations that make leaving home difficult:  requires assist of another to leave home   Nursing/Therapeutic Services Requested:   Skilled Nursing  Physical Therapy      Skilled nursing orders:  Wound care dressing/changes   PT orders:  Total joint pathway   Frequency:  1 Week 1       Discharge Follow-up with Specified Provider: Jose Artis M.D. at [facility] [phone]; 2 Weeks    As directed    To:  Jose Artis M.D. at [facility] [phone]  [phone]   Follow Up:  2 Weeks       Dressing Change Instructions    As directed    IV. INCISION CARE: May shower and let water run over the incision on post-operative day #5. Do not scrub or rub the incision. Simply let the water run over the incision and pat dry. Keep covered with clean dry dressing as long as there is drainage.    Wash your hands prior to dressing changes  Change the dressing daily as needed to keep incision clean and dry. Utilize dry gauze and paper tape. Avoid touching the side of the gauze that goes against the incision with your hands.  No creams or ointments to the incision  May remove dressing once the incision is free of drainage usually around 5 days post op.  Do not touch or pick at the incision  Check incision every day and notify surgeon immediately if any of the following signs or symptoms are noted:  Increase in baseline redness (bruising is normal)  Increase in baseline swelling around the incision and of the entire extremity  Sudden increase in pain or inability to bend the knee  Drainage oozing from the incision more than 5 days out from surgery.  Pulling apart of the edges of the incision  Increase in overall body temperature (greater than 100.5 degrees) Make sure you are doing your incentive spirometer and deep breathing exercises every day while awake as this is the most frequent cause of low grade fever post operatively.  Your staple removal will be done in the office at your follow-up visit.                 Discharge Disposition Plan:today to home and home health    Date: 11/17/2017    Jose Muñoz MD         Electronically signed by Jose Muñoz MD at 11/17/2017  8:57 AM

## 2017-11-17 NOTE — PROGRESS NOTES
Case Management Discharge Note    Final Note: DC home with [facility]    Discharge Placement     Facility/Agency Request Status Selected? Address Phone Number Fax Number    [facility] Accepted    Yes [address] [place] [address], [facility] [address] [phone]        Other: Other (private auto)    Discharge Codes: 06  Discharged/transferred to home under care of organized home health service organization in anticipation of skilled care

## 2017-11-17 NOTE — PLAN OF CARE
Problem: Patient Care Overview (Adult)  Goal: Plan of Care Review  Outcome: Ongoing (interventions implemented as appropriate)    11/17/17 1107   Coping/Psychosocial Response Interventions   Plan Of Care Reviewed With patient   Patient Care Overview   Progress improving   Outcome Evaluation   Outcome Summary/Follow up Plan Pain tolerable on PO pain meds, ambulates in room and hallway with walker and 1 assist, educated pt on bp monitoring r/t htn, pt verblized understanding. plan for dc home with hh today.         Problem: Knee Replacement, Total (Adult)  Goal: Signs and Symptoms of Listed Potential Problems Will be Absent or Manageable (Knee Replacement, Total)  Outcome: Ongoing (interventions implemented as appropriate)

## 2017-11-17 NOTE — DISCHARGE SUMMARY
Orthopedic Discharge Summary      Patient: Risa Jimenez  YOB: 1955  Medical Record Number: [id number]    Attending Physician: Jose Muñoz MD  Consulting Physician(s):  none    Date of Admission: 11/15/2017  7:10 AM  Date of Discharge: 11/17/17    Discharge Diagnosis: LEFT TOTAL KNEE ARTHROPLASTY WITH SARAH NAVIGATION,   Acute Blood Loss Anemia, stable  Post-operative Pain  Limited mobility, requires use of walker and assistance when OOB.    Presenting Problem/History of Present Illness: Arthritis of left knee [M17.12]  Arthritis of left knee [M17.12]        Allergies:   Allergies   Allergen Reactions   • Moxifloxacin Other (See Comments)     HYPERTENSION   • Penicillins Rash       Discharge Medications     Risa Jimenez   Home Medication Instructions GEOFFREY:[id number]    Printed on:11/17/17 0147   Medication Information                      aspirin  MG EC tablet  Take 1 tablet by mouth 2 (Two) Times a Day for 82 doses.             atorvastatin (LIPITOR) 80 MG tablet  Take 80 mg by mouth Every Night.             bisacodyl (DULCOLAX) 5 MG EC tablet  Take 2 tablets by mouth Daily As Needed for Constipation.             Cholecalciferol (VITAMIN D) 2000 units tablet  Take 2,000 Units by mouth Daily. HOLD PRIOR TO SURG             docusate sodium 100 MG capsule  Take 100 mg by mouth 2 (Two) Times a Day.             fluticasone (FLONASE) 50 MCG/ACT nasal spray  2 sprays into each nostril Daily.             gabapentin (NEURONTIN) 300 MG capsule  Take 900 mg by mouth Every Night.             lisinopril (PRINIVIL,ZESTRIL) 10 MG tablet  Take 10 mg by mouth Every Night.             ondansetron ODT (ZOFRAN-ODT) 4 MG disintegrating tablet  Take 1 tablet by mouth Every 6 (Six) Hours As Needed for Nausea or Vomiting.             oxyCODONE-acetaminophen (PERCOCET) 7.5-325 MG per tablet  1-2 tabs po q 3-4 hr prn severe pain, wean as tolerated             polyethylene glycol (MIRALAX) pack packet  Take 17 g by  mouth 2 (Two) Times a Day.             pseudoephedrine-guaifenesin (MUCINEX D)  MG per 12 hr tablet  Take 1 tablet by mouth 2 (Two) Times a Day As Needed.             sertraline (ZOLOFT) 50 MG tablet  Take 75 mg by mouth Daily.                   Past Medical History:   Diagnosis Date   • Arthritis    • High cholesterol    • Hypertension    • Left knee pain    • Left leg pain    • Sciatic leg pain     LEFT        Past Surgical History:   Procedure Laterality Date   • BACK SURGERY  2012    Lumbar   • BREAST BIOPSY     • CHOLECYSTECTOMY     • JOINT REPLACEMENT Right 2015   • SINUS SURGERY     • TOTAL KNEE ARTHROPLASTY Left 11/15/2017    Procedure: LEFT TOTAL KNEE ARTHROPLASTY WITH SARAH NAVIGATION;  Surgeon: Jose Muñoz MD;  Location: [facility] MAIN OR;  Service:         Social History     Occupational History   • Not on file.     Social History Main Topics   • Smoking status: Never Smoker   • Smokeless tobacco: Never Used   • Alcohol use Yes      Comment: OCCASIONALLY   • Drug use: No   • Sexual activity: Defer    Social History     Social History Narrative        Family History   Problem Relation Age of Onset   • Hypertension Sister    • Malig Hyperthermia Neg Hx          Physical Exam: 62 y.o. female Body mass index is 37.11 kg/(m^2).    General Appearance:    Alert, cooperative, in no acute distress                    Vitals:    11/16/17 1900 11/16/17 2300 11/17/17 0329 11/17/17 0700   BP: 108/65 104/63 103/63 98/62   BP Location: Left arm Left arm Right arm Left arm   Patient Position: Lying Lying Lying Sitting   Pulse: 73 80 72 71   Resp: 16 16 16 16   Temp: 98.4 °F (36.9 °C) 97.6 °F (36.4 °C) 97.6 °F (36.4 °C) 97 °F (36.1 °C)   TempSrc: Oral Oral Oral Oral   SpO2: 98% 96% 92%    Weight:       Height:            DIAGNOSTIC TESTS:   Admission on 11/15/2017   Component Date Value Ref Range Status   • Glucose 11/15/2017 113  70 - 130 mg/dL Final   • Hemoglobin 11/16/2017 10.6* 11.9 - 15.5 g/dL Final   •  Hematocrit 11/16/2017 32.1* 35.6 - 45.5 % Final   • Glucose 11/16/2017 108* 65 - 99 mg/dL Final   • BUN 11/16/2017 16  8 - 23 mg/dL Final   • Creatinine 11/16/2017 0.71  0.57 - 1.00 mg/dL Final   • Sodium 11/16/2017 137  136 - 145 mmol/L Final   • Potassium 11/16/2017 4.9  3.5 - 5.2 mmol/L Final   • Chloride 11/16/2017 99  98 - 107 mmol/L Final   • CO2 11/16/2017 26.1  22.0 - 29.0 mmol/L Final   • Calcium 11/16/2017 9.0  8.6 - 10.5 mg/dL Final   • eGFR Non African Amer 11/16/2017 83  >60 mL/min/1.73 Final   • BUN/Creatinine Ratio 11/16/2017 22.5  7.0 - 25.0 Final   • Anion Gap 11/16/2017 11.9  mmol/L Final   • Hemoglobin 11/17/2017 10.1* 11.9 - 15.5 g/dL Final   • Hematocrit 11/17/2017 31.4* 35.6 - 45.5 % Final       Xr Knee 1 Or 2 View Left    Result Date: 11/15/2017  Narrative: 2 VIEWS OF THE LEFT KNEE  HISTORY: Postoperative knee pain  FINDINGS: 1. Satisfactory appearance following total knee arthroplasty, no evidence of a complication.   This report was finalized on 11/15/2017 11:21 AM by Dr. Chrits Paris MD.      Xr Knee 3 View Left    Impression: Ordering physician's impression is located in the Encounter Note dated 11/10/17.       Hospital Course:  62 y.o. female admitted to [facility] to services of Jose Muñoz MD with Arthritis of left knee [M17.12]  Arthritis of left knee [M17.12] on 11/15/2017 and underwent LEFT TOTAL KNEE ARTHROPLASTY WITH SARAH NAVIGATION  Per Jose Muñoz MD. Antibiotic and VTE prophylaxis were per SCIP protocols. Post-operatively the patient transferred to the post-operative floor where the patient underwent mobilization therapy that included active as well as passive ROM exercises. Opioids were titrated to achieve appropriate pain management to allow for participation in mobilization exercises. Vital signs are now stable. On the day of discharge the wound was clean, dry and intact and calf was soft and non tender and Homans sign was negative. Operative extremity  neurovascular status remains intact.   Appropriate education re: incision care, activity levels, medications, and follow up visits was completed and all questions were answered. The patient is now deemed stable for discharge.    Condition on Discharge:  Stable    Discharge Instructions: Patient is to continue with physical therapy exercises twice daily and continue working with the physical therapist as ordered. Patient may weight bear as tolerated unless otherwise specified. Continue GRETA hose daily (for six weeks) and ice regularly. Patient instructed on frequent calf pumping exercises.  Patient also instructed on incentive spirometer during hospitalization and encouraged to continue to use at home regularly.     For Total Knee Replacement: May shower on POD#5.  The wound should be gently cleaned with antibacterial soap then dried and covered with a dry sterile dressing. The wound should be covered at all times except while showering until all drainage has stopped.  Patient may change dressings daily and prn using sterile 4x4 and paper tape, and should call if any unusual drainage, redness or swelling. Staples will be removed in the office in 2 weeks.    Follow up Instructions:  Follow up in the office with Dr. Jose Artis in 2 weeks - patient to call the office at [phone] to schedule. Prescriptions were given for pain medication and blood thinner therapy.    Follow-up Appointments  Future Appointments  Date Time Provider Department Center   11/28/2017 3:20 PM CARMEN [facility]obarJ [facility] None     Additional Instructions for the Follow-ups that You Need to Schedule     Ambulatory Referral to Home Health    As directed    Face to Face Visit Date:  11/15/2017   Follow-up Provider for Plan of Care?:  I will be treating the patient on an ongoing basis.  Please send me the Plan of Care for signature.   Follow-up Provider:  JOSE ARTIS   Reason/Clinical Findings:  post operative pain with ambulation, requires  use of a walker for ambulation   Describe mobility limitations that make leaving home difficult:  requires assist of another to leave home   Nursing/Therapeutic Services Requested:   Skilled Nursing  Physical Therapy      Skilled nursing orders:  Wound care dressing/changes   PT orders:  Total joint pathway   Frequency:  1 Week 1       Discharge Follow-up with Specified Provider: Jose Muñoz M.D. at [facility] [phone]; 2 Weeks    As directed    To:  Jose Muñoz M.D. at [facility] [phone]   Follow Up:  2 Weeks       Dressing Change Instructions    As directed    IV. INCISION CARE: May shower and let water run over the incision on post-operative day #5. Do not scrub or rub the incision. Simply let the water run over the incision and pat dry. Keep covered with clean dry dressing as long as there is drainage.    Wash your hands prior to dressing changes  Change the dressing daily as needed to keep incision clean and dry. Utilize dry gauze and paper tape. Avoid touching the side of the gauze that goes against the incision with your hands.  No creams or ointments to the incision  May remove dressing once the incision is free of drainage usually around 5 days post op.  Do not touch or pick at the incision  Check incision every day and notify surgeon immediately if any of the following signs or symptoms are noted:  Increase in baseline redness (bruising is normal)  Increase in baseline swelling around the incision and of the entire extremity  Sudden increase in pain or inability to bend the knee  Drainage oozing from the incision more than 5 days out from surgery.  Pulling apart of the edges of the incision  Increase in overall body temperature (greater than 100.5 degrees) Make sure you are doing your incentive spirometer and deep breathing exercises every day while awake as this is the most frequent cause of low grade fever post  operatively.  Your staple removal will be done in the office at your follow-up visit.                 Discharge Disposition Plan:today to home and home health    Date: 11/17/2017    Jose Muñoz MD

## 2017-11-28 ENCOUNTER — OFFICE VISIT (OUTPATIENT)
Dept: ORTHOPEDIC SURGERY | Facility: CLINIC | Age: 62
End: 2017-11-28

## 2017-11-28 VITALS — WEIGHT: 190 LBS | BODY MASS INDEX: 37.3 KG/M2 | HEIGHT: 60 IN | TEMPERATURE: 97.8 F

## 2017-11-28 DIAGNOSIS — Z96.652 S/P TOTAL KNEE ARTHROPLASTY, LEFT: Primary | ICD-10-CM

## 2017-11-28 PROCEDURE — 99024 POSTOP FOLLOW-UP VISIT: CPT | Performed by: NURSE PRACTITIONER

## 2017-11-28 PROCEDURE — 73560 X-RAY EXAM OF KNEE 1 OR 2: CPT | Performed by: NURSE PRACTITIONER

## 2017-11-28 RX ORDER — OXYCODONE AND ACETAMINOPHEN 7.5; 325 MG/1; MG/1
TABLET ORAL
Qty: 60 TABLET | Refills: 0
Start: 2017-11-28 | End: 2018-05-02

## 2017-11-28 NOTE — PROGRESS NOTES
Risa Jimenez : 1955 MRN: [id number] DATE: 2017  Body mass index is 37.11 kg/(m^2).  Vitals:    17 1519   Temp: 97.8 °F (36.6 °C)       DIAGNOSIS: 2 week follow up left total knee arthroplasty    SUBJECTIVE:Patient returns today for 2 week follow up of left total knee replacement. Patient reports doing well with no unusual complaints. Appears to be progressing appropriately.    OBJECTIVE:   Exam:. The incision is healing appropriately. No sign of infection. Range of motion is progressing as expected 0/120. The calf is soft and nontender with a negative Homans sign.    DIAGNOSTIC STUDIES  Xrays: 2 views of the left knee (AP full length and lateral) were ordered and images were reviewed for evaluation of recent knee replacement. They demonstrate a well positioned, well aligned knee replacement without complicating factors noted. In comparison with previous films there has been interval implant placement.    ASSESSMENT: 2 week status post left knee replacement expected healing.    PLAN: 1) Staples removed and steri strips applied   2) Order given for PT and pain medicine (as needed), may restart Meloxicam (Mobic) now.   3) Continue GRETA hose for four weeks   4) Continue ice PRN   5) Continue EC Aspirin 325mg by mouth twice a day until 6 weeks post op.   6) Follow up in 4 weeks with repeat Xrays of left knee (2 views)   7) Continue with antibiotic prophylaxis with dental procedures for 2 yrs post total joint replacement.    Amada Munguia, CARMEN  2017     Pain Medications utilized after surgery are narcotics and the law requires that the following information be given to all patients that are prescribed narcotics:    CLASSIFICATION: Pain medications are called Opioids and are narcotics  LEGALITIES: It is illegal to share narcotics with others and to drive within 4 hours of taking narcotics  POTENTIAL SIDE EFFECTS: Potential side effects of opioids include: nausea, vomiting, itching, dizziness,  drowsiness, dry mouth, constipation, and difficulty urinating.  POTENTIAL ADVERSE EFFECTS:   Opioid tolerance can develop with use of pain medications and this simply means that it requires more and more of the medication to control pain; however, this is seen more in patients that use opioids for longer periods of time.  Opioid dependence can develop with use of Opioids and this simply means that to stop the medication can cause withdrawal symptoms so wean gradually (do not stop all at once); however, this is seen more with patients that use opioids for longer periods of time.  Opioid addiction can develop with use of Opioids and the incidence of this is very unlikely in patients who take the medications as ordered and stop the medications as instructed.  Opioid overdose can be dangerous, but is unlikely when the medication is taken as ordered and stopped when ordered. It is important not to mix opioids with alcohol or with and type of sedative such as Benadryl as this can lead to over sedation and respiratory difficulty.    DOSAGE:   Pain medications will need to be taken consistently for the first week to decrease pain and promote adequate pain relief and participation in physical therapy.    After the initial surgical pain begins to resolve, you may begin to decrease the pain medication. By the end of 8 weeks, you should be off of pain medications.    Refills will not be given by the office during evening hours, on weekends.  To seek refills on pain medications during the initial 6 week post-operative period, you must call the office 48 hours in advance to request the refill. The office will then notify you when to  the prescription. DO NOT wait until you are out of the medication to request a refill.

## 2017-11-28 NOTE — PATIENT INSTRUCTIONS
DIAGNOSIS: 2 week follow up left total knee arthroplasty    SUBJECTIVE:Patient returns today for 2 week follow up of left total knee replacement. Patient reports doing well with no unusual complaints. Appears to be progressing appropriately.    OBJECTIVE:   Exam:. The incision is healing appropriately. No sign of infection. Range of motion is progressing as expected 0/120. The calf is soft and nontender with a negative Homans sign.    DIAGNOSTIC STUDIES  Xrays: 2 views of the left knee (AP full length and lateral) were ordered and images were reviewed for evaluation of recent knee replacement. They demonstrate a well positioned, well aligned knee replacement without complicating factors noted. In comparison with previous films there has been interval implant placement.    ASSESSMENT: 2 week status post left knee replacement expected healing.    PLAN: 1) Staples removed and steri strips applied   2) Order given for PT and pain medicine (as needed), may restart Meloxicam (Mobic) now.   3) Continue GRETA hose for four weeks   4) Continue ice PRN   5) Continue EC Aspirin 325mg by mouth twice a day until 6 weeks post op.   6) Follow up in 4 weeks with repeat Xrays of left knee (2 views)   7) Continue with antibiotic prophylaxis with dental procedures for 2 yrs post total joint replacement.    Amada Munguia, APRN  11/28/2017     Pain Medications utilized after surgery are narcotics and the law requires that the following information be given to all patients that are prescribed narcotics:    CLASSIFICATION: Pain medications are called Opioids and are narcotics  LEGALITIES: It is illegal to share narcotics with others and to drive within 4 hours of taking narcotics  POTENTIAL SIDE EFFECTS: Potential side effects of opioids include: nausea, vomiting, itching, dizziness, drowsiness, dry mouth, constipation, and difficulty urinating.  POTENTIAL ADVERSE EFFECTS:   Opioid tolerance can develop with use of pain medications and  this simply means that it requires more and more of the medication to control pain; however, this is seen more in patients that use opioids for longer periods of time.  Opioid dependence can develop with use of Opioids and this simply means that to stop the medication can cause withdrawal symptoms so wean gradually (do not stop all at once); however, this is seen more with patients that use opioids for longer periods of time.  Opioid addiction can develop with use of Opioids and the incidence of this is very unlikely in patients who take the medications as ordered and stop the medications as instructed.  Opioid overdose can be dangerous, but is unlikely when the medication is taken as ordered and stopped when ordered. It is important not to mix opioids with alcohol or with and type of sedative such as Benadryl as this can lead to over sedation and respiratory difficulty.    DOSAGE:   Pain medications will need to be taken consistently for the first week to decrease pain and promote adequate pain relief and participation in physical therapy.    After the initial surgical pain begins to resolve, you may begin to decrease the pain medication. By the end of 8 weeks, you should be off of pain medications.    Refills will not be given by the office during evening hours, on weekends.  To seek refills on pain medications during the initial 6 week post-operative period, you must call the office 48 hours in advance to request the refill. The office will then notify you when to  the prescription. DO NOT wait until you are out of the medication to request a refill.

## 2017-11-29 ENCOUNTER — TREATMENT (OUTPATIENT)
Dept: PHYSICAL THERAPY | Facility: CLINIC | Age: 62
End: 2017-11-29

## 2017-11-29 DIAGNOSIS — Z47.1 AFTERCARE FOLLOWING LEFT KNEE JOINT REPLACEMENT SURGERY: ICD-10-CM

## 2017-11-29 DIAGNOSIS — Z96.652 PAIN DUE TO TOTAL LEFT KNEE REPLACEMENT, SUBSEQUENT ENCOUNTER: ICD-10-CM

## 2017-11-29 DIAGNOSIS — Z96.652 AFTERCARE FOLLOWING LEFT KNEE JOINT REPLACEMENT SURGERY: ICD-10-CM

## 2017-11-29 DIAGNOSIS — R26.9 GAIT DISTURBANCE: Primary | ICD-10-CM

## 2017-11-29 DIAGNOSIS — T84.84XD PAIN DUE TO TOTAL LEFT KNEE REPLACEMENT, SUBSEQUENT ENCOUNTER: ICD-10-CM

## 2017-11-29 PROCEDURE — 97161 PT EVAL LOW COMPLEX 20 MIN: CPT | Performed by: PHYSICAL THERAPIST

## 2017-11-29 PROCEDURE — 97110 THERAPEUTIC EXERCISES: CPT | Performed by: PHYSICAL THERAPIST

## 2017-11-29 NOTE — PROGRESS NOTES
Physical Therapy Initial Evaluation and Plan of Care    Patient: Risa Jimenez   : 1955  Diagnosis/ICD-10 Code:  Gait disturbance [R26.9]  Referring practitioner: CARMEN Escobar    Subjective Evaluation    History of Present Illness  Date of surgery: 11/15/2017  Mechanism of injury: (L) TKA by Drew Muñoz MD.  Already doing better then prior to Sx.      PMHX:  (R) TKA May 2015,  Lumbar (L) Micro-Discectomy L5-S1     Occupation:  Nurse Practitioner -  Return to work 2018  Activities:  Want to return to walking,   PLOF: Independent  Medical Hx Reviewed.      Quality of life: excellent    Pain  Current pain ratin  At best pain ratin  At worst pain ratin  Location: L Knee  Quality: dull ache  Relieving factors: medications, ice, change in position and rest (Elevation)  Aggravating factors: squatting, standing and ambulation  Progression: improved    Social Support  Lives in: multiple-level home (2nd Fl Bedroom)  Lives with: alone (Small Dog)    Hand dominance: right             Objective       Observations   Left Knee   Positive for incision.     Additional Observation Details  No signs or symptoms of drainage or infection.     Active Range of Motion   Left Knee   Flexion: 134 degrees   Extensor la degrees     Strength/Myotome Testing     Left Hip   Planes of Motion   Flexion: 4+    Right Hip   Planes of Motion   Flexion: 5    Left Knee   Flexion: 4-  Extension: 4-    Right Knee   Flexion: 5  Extension: 5    Left Ankle/Foot   Dorsiflexion: 5    Right Ankle/Foot   Dorsiflexion: 5         Assessment & Plan     Assessment  Impairments: abnormal or restricted ROM, activity intolerance, impaired physical strength, lacks appropriate home exercise program, pain with function and weight-bearing intolerance  Assessment details: Pt presents to PT with symptoms consistent with (L) TKR.  Pt would benefit from skilled PT intervention to address the deficits noted.   Prognosis: good  Functional  Limitations: carrying objects, walking, sitting, standing and unable to perform repetitive tasks  Goals  Plan Goals: SHORT TERM GOALS: Time for Goal Achievement: 4 weeks    1.  Patient to be compliant with HEP.   2.  Pt able to ascend/descend steps and transfer with less knee pain < 5/10  3.  Increased hip joint mobility to allow for decreased stress at tibiofemoral joint  4.  Pt. to exhibit increased LE endurance/strength to 4/5 to allow for increased ease with sit-stand and standing/walking > 30 minutes, such as walking in airports.    LONG TERM GOALS: Time for Goal Achievement: 8 weeks  1.  Pt to score < 15% perceived disability on WOMAC.  2.  Patient able to ascend/descend steps and prolonged standing & cooking with pain < 2/10  3.  Pt to exhibit full (L) knee AROM to allow for kneeling, bending squatting as is necessary for ADL's, IADL's and household activities.   4.  Pt to exhibit LE endurance/strength to 5/5 to allow for walking, steps and transfers to occur safely  5.  Pt to demonstrate increased stability of the knee to balance on foam as needed to traverse uneven terrain .          Plan  Therapy options: will be seen for skilled physical therapy services  Planned modality interventions: ultrasound, electrical stimulation/Russian stimulation, thermotherapy (hydrocollator packs) and cryotherapy  Other planned modality interventions: Dry Needling  Planned therapy interventions: balance/weight-bearing training, body mechanics training, functional ROM exercises, flexibility, home exercise program, joint mobilization, stretching, strengthening, soft tissue mobilization, neuromuscular re-education and manual therapy  Frequency: 2x week  Duration in weeks: 8  Treatment plan discussed with: patient        Manual Therapy:         mins  02746;  Therapeutic Exercise:    12     mins  48305;     Neuromuscular Gila:        mins  51828;    Therapeutic Activity:          mins  16070;     Gait Training:           mins   53221;     Ultrasound:          mins  94952;    Electrical Stimulation:         mins  05338 ( );  Dry Needling          mins self-pay    Timed Treatment:   12   mins   Total Treatment:     65   mins    PT SIGNATURE: LEOLA Navas Lic #[id number]    DATE TREATMENT INITIATED: 11/29/2017    Initial Certification    Certification Period: 2/27/2018  I certify that the therapy services are furnished while this patient is under my care.  The services outlined above are required by this patient, and will be reviewed every 90 days.     PHYSICIAN: Amada Munguia, APRN      DATE:     Please sign and return via fax to [phone].. Thank you, [facility].

## 2017-12-01 ENCOUNTER — TREATMENT (OUTPATIENT)
Dept: PHYSICAL THERAPY | Facility: CLINIC | Age: 62
End: 2017-12-01

## 2017-12-01 DIAGNOSIS — Z96.652 AFTERCARE FOLLOWING LEFT KNEE JOINT REPLACEMENT SURGERY: ICD-10-CM

## 2017-12-01 DIAGNOSIS — Z96.652 PAIN DUE TO TOTAL LEFT KNEE REPLACEMENT, SUBSEQUENT ENCOUNTER: ICD-10-CM

## 2017-12-01 DIAGNOSIS — Z47.1 AFTERCARE FOLLOWING LEFT KNEE JOINT REPLACEMENT SURGERY: ICD-10-CM

## 2017-12-01 DIAGNOSIS — T84.84XD PAIN DUE TO TOTAL LEFT KNEE REPLACEMENT, SUBSEQUENT ENCOUNTER: ICD-10-CM

## 2017-12-01 DIAGNOSIS — R26.9 GAIT DISTURBANCE: Primary | ICD-10-CM

## 2017-12-01 PROCEDURE — 97110 THERAPEUTIC EXERCISES: CPT | Performed by: PHYSICAL THERAPIST

## 2017-12-01 PROCEDURE — 97140 MANUAL THERAPY 1/> REGIONS: CPT | Performed by: PHYSICAL THERAPIST

## 2017-12-01 NOTE — PROGRESS NOTES
Physical Therapy Daily Progress Note  Visit: 2    Risa Jimenez reports: My (L) knee was painful in the middle of the night after my 1st visit.  I am doing ok today.      Subjective     Objective   See Exercise, Manual, and Modality Logs for complete treatment.       Assessment & Plan     Assessment  Assessment details: Pt needed review of HEP.  Pt lionel Rx well while in the clinic.     Plan  Plan details: Progress ROM / strengthening /stabilization / functional activity as tolerated                   Manual Therapy:    10     mins  70298;  Therapeutic Exercise:    25     mins  02544;     Neuromuscular Gila:        mins  77307;    Therapeutic Activity:          mins  43307;     Gait Training:           mins  98190;     Ultrasound:          mins  21548;    Electrical Stimulation:         mins  14804 ( );  Dry Needling          mins self-pay    Timed Treatment:   35   mins   Total Treatment:     55   mins    Duane Salas PT  [place] Lic. # [id number]  Physical Therapist

## 2017-12-04 ENCOUNTER — TREATMENT (OUTPATIENT)
Dept: PHYSICAL THERAPY | Facility: CLINIC | Age: 62
End: 2017-12-04

## 2017-12-04 DIAGNOSIS — R26.9 GAIT DISTURBANCE: Primary | ICD-10-CM

## 2017-12-04 DIAGNOSIS — Z47.1 AFTERCARE FOLLOWING LEFT KNEE JOINT REPLACEMENT SURGERY: ICD-10-CM

## 2017-12-04 DIAGNOSIS — T84.84XD PAIN DUE TO TOTAL LEFT KNEE REPLACEMENT, SUBSEQUENT ENCOUNTER: ICD-10-CM

## 2017-12-04 DIAGNOSIS — Z96.652 PAIN DUE TO TOTAL LEFT KNEE REPLACEMENT, SUBSEQUENT ENCOUNTER: ICD-10-CM

## 2017-12-04 DIAGNOSIS — Z96.652 AFTERCARE FOLLOWING LEFT KNEE JOINT REPLACEMENT SURGERY: ICD-10-CM

## 2017-12-04 PROCEDURE — 97110 THERAPEUTIC EXERCISES: CPT | Performed by: PHYSICAL THERAPIST

## 2017-12-04 PROCEDURE — 97530 THERAPEUTIC ACTIVITIES: CPT | Performed by: PHYSICAL THERAPIST

## 2017-12-04 NOTE — PROGRESS NOTES
Physical Therapy Daily Progress Note  Visit: 3    Risa Jimenez reports: I am doing good.  I am sore from decorating and cleaning at my house.      Subjective     Objective   See Exercise, Manual, and Modality Logs for complete treatment.       Assessment & Plan     Assessment  Assessment details: Pt tolerated Rx much better today vs previous visit.  Continuing to progress as lionel.                   Manual Therapy:         mins  33681;  Therapeutic Exercise:    25     mins  47019;     Neuromuscular Gila:        mins  16013;    Therapeutic Activity:     10     mins  63562;     Gait Training:           mins  99572;     Ultrasound:          mins  66728;    Electrical Stimulation:         mins  45507 ( );  Dry Needling          mins self-pay    Timed Treatment:   35   mins   Total Treatment:     55   mins    Duane Salas PT  [place] Lic. # [id number]  Physical Therapist

## 2017-12-07 ENCOUNTER — TREATMENT (OUTPATIENT)
Dept: PHYSICAL THERAPY | Facility: CLINIC | Age: 62
End: 2017-12-07

## 2017-12-07 DIAGNOSIS — Z96.652 AFTERCARE FOLLOWING LEFT KNEE JOINT REPLACEMENT SURGERY: ICD-10-CM

## 2017-12-07 DIAGNOSIS — T84.84XD PAIN DUE TO TOTAL LEFT KNEE REPLACEMENT, SUBSEQUENT ENCOUNTER: ICD-10-CM

## 2017-12-07 DIAGNOSIS — R26.9 GAIT DISTURBANCE: Primary | ICD-10-CM

## 2017-12-07 DIAGNOSIS — Z47.1 AFTERCARE FOLLOWING LEFT KNEE JOINT REPLACEMENT SURGERY: ICD-10-CM

## 2017-12-07 DIAGNOSIS — Z96.652 PAIN DUE TO TOTAL LEFT KNEE REPLACEMENT, SUBSEQUENT ENCOUNTER: ICD-10-CM

## 2017-12-07 PROCEDURE — 97110 THERAPEUTIC EXERCISES: CPT | Performed by: PHYSICAL THERAPIST

## 2017-12-07 PROCEDURE — 97530 THERAPEUTIC ACTIVITIES: CPT | Performed by: PHYSICAL THERAPIST

## 2017-12-07 PROCEDURE — 97140 MANUAL THERAPY 1/> REGIONS: CPT | Performed by: PHYSICAL THERAPIST

## 2017-12-07 NOTE — PROGRESS NOTES
Physical Therapy Daily Progress Note  Visit: 4    Risa Jimenez reports: My (L) Knee is very stiff and achy today.      Subjective     Objective   See Exercise, Manual, and Modality Logs for complete treatment.     Reviewed with pt about not over doing activity at home and resting appropriately.    Assessment & Plan     Assessment  Assessment details: Pt was fatigued and needed many VC's for correct exercise performance.  Limited progression today.      Plan  Plan details: Progress ROM / strengthening /stabilization / functional activity as tolerated                   Manual Therapy:    10     mins  64786;  Therapeutic Exercise:    25     mins  73402;     Neuromuscular Gila:        mins  93089;    Therapeutic Activity:     10     mins  71256;     Gait Training:           mins  33965;     Ultrasound:          mins  56665;    Electrical Stimulation:         mins  04556 ( );  Dry Needling          mins self-pay    Timed Treatment:   45   mins   Total Treatment:     65   mins    Duane Salas PT  [place] Lic. # [id number]  Physical Therapist

## 2017-12-11 ENCOUNTER — TREATMENT (OUTPATIENT)
Dept: PHYSICAL THERAPY | Facility: CLINIC | Age: 62
End: 2017-12-11

## 2017-12-11 DIAGNOSIS — Z47.1 AFTERCARE FOLLOWING LEFT KNEE JOINT REPLACEMENT SURGERY: ICD-10-CM

## 2017-12-11 DIAGNOSIS — T84.84XD PAIN DUE TO TOTAL LEFT KNEE REPLACEMENT, SUBSEQUENT ENCOUNTER: ICD-10-CM

## 2017-12-11 DIAGNOSIS — R26.9 GAIT DISTURBANCE: Primary | ICD-10-CM

## 2017-12-11 DIAGNOSIS — Z96.652 PAIN DUE TO TOTAL LEFT KNEE REPLACEMENT, SUBSEQUENT ENCOUNTER: ICD-10-CM

## 2017-12-11 DIAGNOSIS — Z96.652 AFTERCARE FOLLOWING LEFT KNEE JOINT REPLACEMENT SURGERY: ICD-10-CM

## 2017-12-11 PROCEDURE — 97110 THERAPEUTIC EXERCISES: CPT | Performed by: PHYSICAL THERAPIST

## 2017-12-11 PROCEDURE — 97530 THERAPEUTIC ACTIVITIES: CPT | Performed by: PHYSICAL THERAPIST

## 2017-12-11 NOTE — PROGRESS NOTES
Physical Therapy Daily Progress Note  Visit: 5    Risa Jimenez reports: I am sore and achy from this weekend but I am still doing better overall.      Subjective     Objective   See Exercise, Manual, and Modality Logs for complete treatment.       Assessment & Plan     Assessment  Assessment details: Pt lionel Rx well with new exercises introduced.  Pt conts to be progressing well.      Plan  Plan details: Progress ROM / strengthening / stabilization / functional activity as tolerated                   Manual Therapy:         mins  21124;  Therapeutic Exercise:    30     mins  26846;     Neuromuscular Gila:        mins  80448;    Therapeutic Activity:     10     mins  42868;     Gait Training:           mins  40915;     Ultrasound:          mins  24252;    Electrical Stimulation:         mins  12983 ( );  Dry Needling          mins self-pay    Timed Treatment:   40   mins   Total Treatment:     60   mins    Duane Salas PT  [place] Lic. # [id number]  Physical Therapist

## 2017-12-15 ENCOUNTER — TREATMENT (OUTPATIENT)
Dept: PHYSICAL THERAPY | Facility: CLINIC | Age: 62
End: 2017-12-15

## 2017-12-15 DIAGNOSIS — R26.9 GAIT DISTURBANCE: Primary | ICD-10-CM

## 2017-12-15 DIAGNOSIS — Z96.652 AFTERCARE FOLLOWING LEFT KNEE JOINT REPLACEMENT SURGERY: ICD-10-CM

## 2017-12-15 DIAGNOSIS — T84.84XD PAIN DUE TO TOTAL LEFT KNEE REPLACEMENT, SUBSEQUENT ENCOUNTER: ICD-10-CM

## 2017-12-15 DIAGNOSIS — Z96.652 PAIN DUE TO TOTAL LEFT KNEE REPLACEMENT, SUBSEQUENT ENCOUNTER: ICD-10-CM

## 2017-12-15 DIAGNOSIS — Z47.1 AFTERCARE FOLLOWING LEFT KNEE JOINT REPLACEMENT SURGERY: ICD-10-CM

## 2017-12-15 PROCEDURE — 97110 THERAPEUTIC EXERCISES: CPT | Performed by: PHYSICAL THERAPIST

## 2017-12-15 PROCEDURE — 97530 THERAPEUTIC ACTIVITIES: CPT | Performed by: PHYSICAL THERAPIST

## 2017-12-15 PROCEDURE — 97112 NEUROMUSCULAR REEDUCATION: CPT | Performed by: PHYSICAL THERAPIST

## 2017-12-15 NOTE — PROGRESS NOTES
Physical Therapy Daily Progress Note  Visit: 6    Risa Jimenez reports: My (L) knee is doing better.  I am feeling better since I stopped taking my pain meds, I am not in the fog now.      Subjective     Objective   See Exercise, Manual, and Modality Logs for complete treatment.       Assessment & Plan     Assessment  Assessment details: Pt was fatigued with Rx in the clinic and the progression of the exercises.      Plan  Plan details: Progress ROM / strengthening / stabilization / functional activity as tolerated                   Manual Therapy:         mins  28146;  Therapeutic Exercise:    35     mins  38783;     Neuromuscular Gila:    10    mins  33047;    Therapeutic Activity:     10     mins  43014;     Gait Training:           mins  99090;     Ultrasound:          mins  57541;    Electrical Stimulation:         mins  82433 ( );  Dry Needling          mins self-pay    Timed Treatment:   55   mins   Total Treatment:     75   mins    Duane Salas PT  [place] Lic. # [id number]  Physical Therapist

## 2017-12-27 ENCOUNTER — TREATMENT (OUTPATIENT)
Dept: PHYSICAL THERAPY | Facility: CLINIC | Age: 62
End: 2017-12-27

## 2017-12-27 DIAGNOSIS — Z96.652 AFTERCARE FOLLOWING LEFT KNEE JOINT REPLACEMENT SURGERY: ICD-10-CM

## 2017-12-27 DIAGNOSIS — Z96.652 PAIN DUE TO TOTAL LEFT KNEE REPLACEMENT, SUBSEQUENT ENCOUNTER: ICD-10-CM

## 2017-12-27 DIAGNOSIS — T84.84XD PAIN DUE TO TOTAL LEFT KNEE REPLACEMENT, SUBSEQUENT ENCOUNTER: ICD-10-CM

## 2017-12-27 DIAGNOSIS — Z47.1 AFTERCARE FOLLOWING LEFT KNEE JOINT REPLACEMENT SURGERY: ICD-10-CM

## 2017-12-27 DIAGNOSIS — R26.9 GAIT DISTURBANCE: Primary | ICD-10-CM

## 2017-12-27 PROCEDURE — 97110 THERAPEUTIC EXERCISES: CPT | Performed by: PHYSICAL THERAPIST

## 2017-12-27 PROCEDURE — 97112 NEUROMUSCULAR REEDUCATION: CPT | Performed by: PHYSICAL THERAPIST

## 2017-12-27 NOTE — PROGRESS NOTES
Physical Therapy Daily Progress Note  Visit: 7    Risa Tony reports: I was really sore after last session. It took me 4 days to recover    Subjective     Objective   See Exercise, Manual, and Modality Logs for complete treatment.       Assessment & Plan     Assessment  Assessment details: Deferred sit-stand and second rockerboard activity due to increased pain last session. Will progress activity back up as able    Plan  Plan details: Progress per POC        Manual Therapy:    -     mins  02790;  Therapeutic Exercise:    30     mins  86445;     Neuromuscular Gila:    5    mins  22219;    Therapeutic Activity:     -     mins  28154;     Gait Training:      -     mins  82038;     Ultrasound:     -     mins  52423;    Electrical Stimulation:    -     mins  15241 ( );  Dry Needling     -     mins self-pay    Timed Treatment:   35   mins   Total Treatment:     60   mins    Michelle Donnelly PT  [place] License #: [id number]    Physical Therapist

## 2017-12-29 ENCOUNTER — TREATMENT (OUTPATIENT)
Dept: PHYSICAL THERAPY | Facility: CLINIC | Age: 62
End: 2017-12-29

## 2017-12-29 DIAGNOSIS — Z47.1 AFTERCARE FOLLOWING LEFT KNEE JOINT REPLACEMENT SURGERY: ICD-10-CM

## 2017-12-29 DIAGNOSIS — R26.9 GAIT DISTURBANCE: Primary | ICD-10-CM

## 2017-12-29 DIAGNOSIS — T84.84XD PAIN DUE TO TOTAL LEFT KNEE REPLACEMENT, SUBSEQUENT ENCOUNTER: ICD-10-CM

## 2017-12-29 DIAGNOSIS — Z96.652 AFTERCARE FOLLOWING LEFT KNEE JOINT REPLACEMENT SURGERY: ICD-10-CM

## 2017-12-29 DIAGNOSIS — Z96.652 PAIN DUE TO TOTAL LEFT KNEE REPLACEMENT, SUBSEQUENT ENCOUNTER: ICD-10-CM

## 2017-12-29 PROCEDURE — 97112 NEUROMUSCULAR REEDUCATION: CPT | Performed by: PHYSICAL THERAPIST

## 2017-12-29 PROCEDURE — 97110 THERAPEUTIC EXERCISES: CPT | Performed by: PHYSICAL THERAPIST

## 2017-12-29 NOTE — PROGRESS NOTES
Physical Therapy Daily Progress Note  Visit: 8    Risa Jimenez reports: Doing ok. My left arch of my foot is really bothering me.    Subjective     Objective   See Exercise, Manual, and Modality Logs for complete treatment.       Assessment & Plan     Assessment  Assessment details: Pt tolerated treatment well. Had some pain in left foot with rockerboard activity. Used KT to help with left foot discomfort and educated pt to use frozen water bottle at home to help alleviate sx's     Plan  Plan details: Progress per POC        Manual Therapy:    -     mins  18449;  Therapeutic Exercise:    30     mins  52879;     Neuromuscular Gila:    10    mins  73468;    Therapeutic Activity:     -     mins  47286;     Gait Training:      -     mins  90602;     Ultrasound:     -     mins  71850;    Electrical Stimulation:    -     mins  86574 ( );  Dry Needling     -     mins self-pay    Timed Treatment:   40   mins   Total Treatment:     60   mins    Michelle Donnelly PT  [place] License #: [id number]    Physical Therapist

## 2018-01-02 ENCOUNTER — TREATMENT (OUTPATIENT)
Dept: PHYSICAL THERAPY | Facility: CLINIC | Age: 63
End: 2018-01-02

## 2018-01-02 DIAGNOSIS — Z96.652 AFTERCARE FOLLOWING LEFT KNEE JOINT REPLACEMENT SURGERY: ICD-10-CM

## 2018-01-02 DIAGNOSIS — T84.84XD PAIN DUE TO TOTAL LEFT KNEE REPLACEMENT, SUBSEQUENT ENCOUNTER: ICD-10-CM

## 2018-01-02 DIAGNOSIS — Z96.652 PAIN DUE TO TOTAL LEFT KNEE REPLACEMENT, SUBSEQUENT ENCOUNTER: ICD-10-CM

## 2018-01-02 DIAGNOSIS — Z47.1 AFTERCARE FOLLOWING LEFT KNEE JOINT REPLACEMENT SURGERY: ICD-10-CM

## 2018-01-02 DIAGNOSIS — R26.9 GAIT DISTURBANCE: Primary | ICD-10-CM

## 2018-01-02 PROCEDURE — 97110 THERAPEUTIC EXERCISES: CPT | Performed by: PHYSICAL THERAPIST

## 2018-01-02 PROCEDURE — 97530 THERAPEUTIC ACTIVITIES: CPT | Performed by: PHYSICAL THERAPIST

## 2018-01-02 PROCEDURE — 97112 NEUROMUSCULAR REEDUCATION: CPT | Performed by: PHYSICAL THERAPIST

## 2018-01-02 NOTE — PROGRESS NOTES
Re-Assessment / Progress Note    Patient: Risa Jimenez   : 1955  Diagnosis/ICD-10 Code:  Gait disturbance [R26.9]  Referring practitioner: CARMEN Escobar  Date of Initial Visit: Episode Type: THERAPY  Noted: 2017    Today's Date: 2018  Patient seen for 9 sessions.      Subjective:   Risa Jimenez reports: My (L) knee is doing better and better, but I can tell that I don't have my endurance.  I can also tell that I am unable to straighten my (L) knee as well as my (R) knee.  Subjective Questionnaire: WOMAC: 14% perceived disability  Clinical Progress: improved  Home Program Compliance: Yes  Treatment has included: therapeutic exercise, neuromuscular re-education, manual therapy, gait training, electrical stimulation and cryotherapy    Subjective Evaluation    Pain  At worst pain rating: 3  Location: L Knee         Objective       Active Range of Motion   Left Knee   Flexion: 136 degrees   Extensor lag: 3 degrees     Strength/Myotome Testing     Left Hip   Planes of Motion   Flexion: 4+    Left Knee   Flexion: 4  Extension: 4+    Left Ankle/Foot   Dorsiflexion: 5     Assessment & Plan     Assessment  Assessment details: Risa has improved with with her (L) knee ROM, strength and pain.  She continues to have deficits with strength, extension ROM and poor endurance for WB activities.  The pt would continue to benefit from skilled care at this time to help restore the pt to their prior level of function and help her safely return to work.  Thank you for this referral.      Plan  Plan details: Progress ROM / strengthening /stabilization / functional activity as tolerated        Progress toward previous goals: Partially Met    Goals  SHORT TERM GOALS:  1.  Patient to be compliant with HEP. - MET  2.  Pt able to ascend/descend steps and transfer with less knee pain < 5/10. - MET  3.  Increased hip joint mobility to allow for decreased stress at tibiofemoral joint. - MET  4.  Pt. to exhibit increased LE  endurance/strength to 4/5 to allow for increased ease with sit-stand and standing/walking > 30 minutes, such as walking in airports.- MET    LONG TERM GOALS: Time for Goal Achievement: 2-4 weeks  1.  Pt to score < 15% perceived disability on WOMAC.- MET  2.  Patient able to ascend/descend steps and prolonged standing & cooking with pain < 2/10. - UNMET  3.  Pt to exhibit full (L) knee AROM to allow for kneeling, bending squatting as is necessary for ADL's, IADL's and household activities.- UNMET  4.  Pt to exhibit LE endurance/strength to 5/5 to allow for walking, steps and transfers to occur safely. - UNMET  5.  Pt to demonstrate increased stability of the knee to balance on foam as needed to traverse uneven terrain . - UNMET    Recommendations: Continue as planned  Timeframe: 2-4 weeks  Prognosis to achieve goals: good    PT Signature: Duane Salas, PT  [place] Lic. # [id number]        Based upon review of the patient's progress and continued therapy plan, it is my medical opinion that Risa Jimenez should continue physical therapy treatment at [facility]  [address]  [facility] [address] [phone].    Signature: __________________________________  CARMEN Escobar    Manual Therapy:         mins  86008;  Therapeutic Exercise:    35     mins  00206;     Neuromuscular Gila:    10    mins  68375;    Therapeutic Activity:     10     mins  71268;     Gait Training:           mins  01089;     Ultrasound:          mins  60080;    Electrical Stimulation:         mins  02156 ( );  Dry Needling          mins self-pay    Timed Treatment:   55   mins   Total Treatment:     65   mins

## 2018-01-03 ENCOUNTER — OFFICE VISIT (OUTPATIENT)
Dept: ORTHOPEDIC SURGERY | Facility: CLINIC | Age: 63
End: 2018-01-03

## 2018-01-03 VITALS — BODY MASS INDEX: 37.3 KG/M2 | TEMPERATURE: 97.6 F | HEIGHT: 60 IN | WEIGHT: 190 LBS

## 2018-01-03 DIAGNOSIS — Z96.652 STATUS POST TOTAL LEFT KNEE REPLACEMENT: Primary | ICD-10-CM

## 2018-01-03 PROCEDURE — 99024 POSTOP FOLLOW-UP VISIT: CPT | Performed by: NURSE PRACTITIONER

## 2018-01-03 PROCEDURE — 73560 X-RAY EXAM OF KNEE 1 OR 2: CPT | Performed by: NURSE PRACTITIONER

## 2018-01-03 RX ORDER — TRAMADOL HYDROCHLORIDE 50 MG/1
TABLET ORAL
Refills: 0 | COMMUNITY
Start: 2017-12-14 | End: 2022-07-13

## 2018-01-03 NOTE — PATIENT INSTRUCTIONS
Chief Complaint and HPI: 6 weeks follow up left total knee     SUBJECTIVE:Patient returns today for follow up of total joint replacement. Patient reports doing well with no unusual complaints. Appears to be progressing appropriately.     OBJECTIVE:   Exam:. The incision is healing appropriately. No sign of infection. Range of motion is progressing as expected 136/-3. The calf is soft and nontender with a negative Homans sign.     DIAGNOSTIC STUDIES  Xrays: 2 views of the leftknee (AP full length and lateral) were ordered and images were reviewed for evaluation of recent joint replacement. They demonstrate a well positioned, well aligned total joint replacement without complicating factors noted. In comparison with previous films there has been no alignment change.     ASSESSMENT: status post left total knee replacement expected healing.     PLAN: 1) Continue with PT exercises as prescribed                        2) Follow up 3 MONTHS  WITH XRAYS (2 views of same joint).                        3) Continue with antibiotic prophylaxis with dental procedures for 2 yrs post total joint replacement.                        4) May discontinue Aspirin therapy from surgery at this time and resume medications, vitamins, and supplements you were taking before surgery.      5) Is doing very well, and may continue PT exercises on her own from this point if she desires.   Amada Munguia, APRN  1/3/2018

## 2018-01-03 NOTE — PROGRESS NOTES
Risa [facility] : 1955 MRN: [id number] DATE: 1/3/2018  Body mass index is 37.11 kg/(m^2).  Vitals:    18 1527   Temp: 97.6 °F (36.4 °C)       Chief Complaint and HPI: 6 weeks follow up left total knee    SUBJECTIVE:Patient returns today for follow up of total joint replacement. Patient reports doing well with no unusual complaints. Appears to be progressing appropriately.    OBJECTIVE:   Exam:. The incision is healing appropriately. No sign of infection. Range of motion is progressing as expected 136/-3. The calf is soft and nontender with a negative Homans sign.    DIAGNOSTIC STUDIES  Xrays: 2 views of the leftknee (AP full length and lateral) were ordered and images were reviewed for evaluation of recent joint replacement. They demonstrate a well positioned, well aligned total joint replacement without complicating factors noted. In comparison with previous films there has been no alignment change.    ASSESSMENT: status post left total knee replacement expected healing.    PLAN: 1) Continue with PT exercises as prescribed   2) Follow up 3 MONTHS  WITH XRAYS (2 views of same joint).   3) Continue with antibiotic prophylaxis with dental procedures for 2 yrs post total joint replacement.   4) May discontinue Aspirin therapy from surgery at this time and resume medications, vitamins, and supplements you were taking before surgery.    5) Is doing very well, and may continue PT exercises on her own from this point if she desires.     Amada Munguia, APRN  1/3/2018

## 2018-05-02 ENCOUNTER — OFFICE VISIT (OUTPATIENT)
Dept: ORTHOPEDIC SURGERY | Facility: CLINIC | Age: 63
End: 2018-05-02

## 2018-05-02 VITALS — WEIGHT: 190 LBS | BODY MASS INDEX: 37.3 KG/M2 | TEMPERATURE: 97.8 F | HEIGHT: 60 IN

## 2018-05-02 DIAGNOSIS — Z96.652 STATUS POST TOTAL LEFT KNEE REPLACEMENT: ICD-10-CM

## 2018-05-02 DIAGNOSIS — Z96.652 HISTORY OF TOTAL KNEE ARTHROPLASTY, LEFT: Primary | ICD-10-CM

## 2018-05-02 PROCEDURE — 73560 X-RAY EXAM OF KNEE 1 OR 2: CPT | Performed by: ORTHOPAEDIC SURGERY

## 2018-05-02 PROCEDURE — 99213 OFFICE O/P EST LOW 20 MIN: CPT | Performed by: ORTHOPAEDIC SURGERY

## 2018-05-02 RX ORDER — MELOXICAM 15 MG/1
TABLET ORAL DAILY
Refills: 0 | COMMUNITY
Start: 2018-04-09 | End: 2022-07-13

## 2018-05-02 NOTE — PROGRESS NOTES
NAME: Risa Jimenez  : 1955   MRN: [id number]   DATE: 2018    CC: 6 months Follow up status post total joint replacement    SUBJECTIVE:    HPI: Patient returns today for a follow up of total joint replacement. Patient reports doing well with no unusual complaints. Appears to be progressing appropriately. Doing very well  HPI    This problem is not new to this examiner.     Allergies:   Allergies   Allergen Reactions   • Moxifloxacin Other (See Comments)     HYPERTENSION   • Penicillins Rash       Medications:   Home Medications:  Current Outpatient Prescriptions on File Prior to Visit   Medication Sig   • atorvastatin (LIPITOR) 80 MG tablet Take 80 mg by mouth Every Night.   • Cholecalciferol (VITAMIN D) 2000 units tablet Take 2,000 Units by mouth Daily. HOLD PRIOR TO SURG   • fluticasone (FLONASE) 50 MCG/ACT nasal spray 2 sprays into each nostril Daily.   • gabapentin (NEURONTIN) 300 MG capsule Take 900 mg by mouth Every Night.   • lisinopril (PRINIVIL,ZESTRIL) 10 MG tablet Take 10 mg by mouth Every Night.   • pseudoephedrine-guaifenesin (MUCINEX D)  MG per 12 hr tablet Take 1 tablet by mouth 2 (Two) Times a Day As Needed.   • sertraline (ZOLOFT) 50 MG tablet Take 75 mg by mouth Daily.   • traMADol (ULTRAM) 50 MG tablet TK 1 TO 2 TS PO BID PRN P   • [DISCONTINUED] oxyCODONE-acetaminophen (PERCOCET) 7.5-325 MG per tablet 1-2 tabs po q 3-4 hr prn severe pain, wean as tolerated     No current facility-administered medications on file prior to visit.        Current Medications:  Scheduled Meds:  Continuous Infusions:  No current facility-administered medications for this visit.   PRN Meds:.    I have reviewed the patient's medical history in detail and updated the computerized patient record.  Review and summarization of old records include:    Past Medical History:   Diagnosis Date   • Allergic    • Arthritis    • High cholesterol    • Hypertension    • Left knee pain    • Left leg pain    • Sciatic leg  pain     LEFT        Past Surgical History:   Procedure Laterality Date   • BACK SURGERY  2012    Micro-Discectomy L5-S1 - Boom Smith MD   • BREAST BIOPSY     • CHOLECYSTECTOMY     • JOINT REPLACEMENT Right 2015   • SINUS SURGERY     • TOTAL KNEE ARTHROPLASTY Left 11/15/2017    Procedure: LEFT TOTAL KNEE ARTHROPLASTY WITH SARAH NAVIGATION; Jose Muñoz MD;  [facility] MAIN OR        Social History     Occupational History   • Not on file.     Social History Main Topics   • Smoking status: Never Smoker   • Smokeless tobacco: Never Used   • Alcohol use Yes      Comment: OCCASIONALLY   • Drug use: No   • Sexual activity: Defer    Social History     Social History Narrative   • No narrative on file        Family History   Problem Relation Age of Onset   • Hypertension Sister    • Malig Hyperthermia Neg Hx        ROS: 14 point review of systems was performed and was negative except for documented findings in HPI and today's encounter.     Allergies:   Allergies   Allergen Reactions   • Moxifloxacin Other (See Comments)     HYPERTENSION   • Penicillins Rash     Constitutional:  Denies fever, shaking or chills   Eyes:  Denies change in visual acuity   HENT:  Denies nasal congestion or sore throat   Respiratory:  Denies cough or shortness of breath   Cardiovascular:  Denies chest pain or severe LE edema   GI:  Denies abdominal pain, nausea, vomiting, bloody stools or diarrhea   Musculoskeletal:  Denies numbness tingling or loss of motor function except as outlined above in history of present illness.  : Denies painful urination or hematuria  Integument:  Denies rash, lesion or ulceration   Neurologic:  Denies headache or focal weakness  Endocrine:  Denies lymphadenopathy  Psych:  Denies confusion or change in mental status   Hem:  Denies active bleeding        OBJECTIVE:     Physical Exam:  Vital Signs:    Wt Readings from Last 3 Encounters:   05/02/18 86.2 kg (190 lb)   01/03/18 86.2 kg (190 lb)   11/28/17 86.2  "kg (190 lb)     Ht Readings from Last 3 Encounters:   05/02/18 152.4 cm (60\")   01/03/18 152.4 cm (60\")   11/28/17 152.4 cm (60\")     Body mass index is 37.11 kg/m².  Facility age limit for growth percentiles is 20 years.  Vitals:    05/02/18 1519   Temp: 97.8 °F (36.6 °C)       Constitutional: Awake alert and oriented x3, well developed, well nourished, no acute distress, non-toxic appearance.  HEENT:  Normocephalic, Atraumatic, Bilateral external ears normal, Oropharynx moist, No oral exudates, Nose normal.   Respiratory:  No respiratory distress, No wheezing  CV: No palpitations  Vascular:  Brisk cap refill, Intact distal pulses, No cyanosis, all compartments soft with no signs or symptoms of compartment syndrome or DVT.  Neurologic: Sensation grossly intact to light touch throughout the involved extremity and bilaterally symmetric, deep tendon reflexes are 2+ and bilaterally symmetric, No focal deficits noted.   Neck:  Normal range of motion, No tenderness, Supple, Negative Spurlings.  Integument: Well hydrated, no rash, warm, dry, no lesions or ulceration.   Musculoskeletal:  Affected extremity post op incision is healed appropriately. No sign of infection. Range of motion is progressing as expected. The calf is soft and nontender with a negative Homans sign. Distal pulses intact. Normal amount of swelling present for recovery time.     DIAGNOSTIC STUDIES  Imaging done today and discussed with the patient:  Indication, findings and comparison: 2V AP&Lat of the operative joint viewed for evaluation of past joint replacement and discussed with patient. They demonstrate a well positioned, well aligned total joint replacement without complicating factors noted. In comparison with the film from the last office visit, there has been no alignment change.    ASSESSMENT: Status post left total knee replacement with expected healing    PLAN: 1) Continue home PT exercises as needed.   2) Continue with antibiotic " prophylaxis with dental procedures for 2 yrs post total joint replacement.   3) Follow up as ordered.    5/2/2018  Patient was seen by Dr. Jose Muñoz in the office today.

## 2018-10-15 ENCOUNTER — TRANSCRIBE ORDERS (OUTPATIENT)
Dept: ADMINISTRATIVE | Facility: HOSPITAL | Age: 63
End: 2018-10-15

## 2018-10-15 DIAGNOSIS — R10.31 RIGHT LOWER QUADRANT PAIN: Primary | ICD-10-CM

## 2018-10-18 ENCOUNTER — HOSPITAL ENCOUNTER (OUTPATIENT)
Dept: CT IMAGING | Facility: HOSPITAL | Age: 63
Discharge: HOME | End: 2018-10-18
Admitting: FAMILY MEDICINE

## 2018-10-18 DIAGNOSIS — R10.31 RIGHT LOWER QUADRANT PAIN: ICD-10-CM

## 2018-10-18 LAB — CREAT BLDA-MCNC: 0.9 MG/DL (ref 0.6–1.3)

## 2018-10-18 PROCEDURE — 82565 ASSAY OF CREATININE: CPT

## 2018-10-18 PROCEDURE — 74177 CT ABD & PELVIS W/CONTRAST: CPT

## 2018-10-18 PROCEDURE — 25010000002 IOPAMIDOL 61 % SOLUTION: Performed by: FAMILY MEDICINE

## 2018-10-18 RX ADMIN — IOPAMIDOL 85 ML: 612 INJECTION, SOLUTION INTRAVENOUS at 14:18

## 2019-04-08 ENCOUNTER — OFFICE VISIT (OUTPATIENT)
Dept: ORTHOPEDIC SURGERY | Facility: CLINIC | Age: 64
End: 2019-04-08

## 2019-04-08 VITALS — TEMPERATURE: 97.3 F | HEIGHT: 60 IN | BODY MASS INDEX: 39.85 KG/M2 | WEIGHT: 203 LBS

## 2019-04-08 DIAGNOSIS — IMO0002 BURSITIS/TENDONITIS, SHOULDER: ICD-10-CM

## 2019-04-08 DIAGNOSIS — M25.512 ACUTE PAIN OF LEFT SHOULDER: Primary | ICD-10-CM

## 2019-04-08 PROCEDURE — 20610 DRAIN/INJ JOINT/BURSA W/O US: CPT | Performed by: ORTHOPAEDIC SURGERY

## 2019-04-08 PROCEDURE — 73030 X-RAY EXAM OF SHOULDER: CPT | Performed by: ORTHOPAEDIC SURGERY

## 2019-04-08 PROCEDURE — 99214 OFFICE O/P EST MOD 30 MIN: CPT | Performed by: ORTHOPAEDIC SURGERY

## 2019-04-08 RX ORDER — MULTIPLE VITAMINS W/ MINERALS TAB 9MG-400MCG
1 TAB ORAL DAILY
COMMUNITY
End: 2023-01-28

## 2019-04-08 RX ADMIN — METHYLPREDNISOLONE ACETATE 80 MG: 80 INJECTION, SUSPENSION INTRA-ARTICULAR; INTRALESIONAL; INTRAMUSCULAR; SOFT TISSUE at 14:30

## 2019-04-08 NOTE — PROGRESS NOTES
New left Shoulder      Patient: Risa Jimenez        YOB: 1955    Medical Record Number: [id number]        Chief Complaints: left shoulder pain  Chief Complaint   Patient presents with   • Left Knee - Follow-up           History of Present Illness: This is a 64-year-old female who presents complaining of left shoulder pain she is right-hand dominant been ongoing 2-3 months no history injury change in activity she is a longtime patient of Dr. Loya her current symptoms are moderate to severe intermittent stabbing aching worse with activity somewhat better with anti-inflammatories she is a nurse practitioner past medical history is unremarkable      Allergies:   Allergies   Allergen Reactions   • Moxifloxacin Other (See Comments)     HYPERTENSION   • Penicillins Rash       Medications:   Home Medications:  Current Outpatient Medications on File Prior to Visit   Medication Sig   • atorvastatin (LIPITOR) 80 MG tablet Take 80 mg by mouth Every Night.   • fluticasone (FLONASE) 50 MCG/ACT nasal spray 2 sprays into each nostril Daily.   • lisinopril (PRINIVIL,ZESTRIL) 10 MG tablet Take 10 mg by mouth Every Night.   • meloxicam (MOBIC) 15 MG tablet Take  by mouth Daily.   • Multiple Vitamins-Minerals (MULTIVITAMIN WITH MINERALS) tablet tablet Take 1 tablet by mouth Daily.   • pseudoephedrine-guaifenesin (MUCINEX D)  MG per 12 hr tablet Take 1 tablet by mouth 2 (Two) Times a Day As Needed.   • sertraline (ZOLOFT) 50 MG tablet Take 75 mg by mouth Daily.   • traMADol (ULTRAM) 50 MG tablet TK 1 TO 2 TS PO BID PRN P   • [DISCONTINUED] Cholecalciferol (VITAMIN D) 2000 units tablet Take 2,000 Units by mouth Daily. HOLD PRIOR TO SURG   • [DISCONTINUED] gabapentin (NEURONTIN) 300 MG capsule Take 900 mg by mouth Every Night.     No current facility-administered medications on file prior to visit.      Current Medications:  Scheduled Meds:  Continuous Infusions:  No current facility-administered medications for  "this visit.   PRN Meds:.    Past Medical History:   Diagnosis Date   • Allergic    • Arthritis    • High cholesterol    • Hypertension    • Left knee pain    • Left leg pain    • Sciatic leg pain     LEFT        Past Surgical History:   Procedure Laterality Date   • BACK SURGERY  2012    Micro-Discectomy L5-S1 - Boom Smith MD   • BREAST BIOPSY     • CHOLECYSTECTOMY     • JOINT REPLACEMENT Right 2015   • SINUS SURGERY     • TOTAL KNEE ARTHROPLASTY Left 11/15/2017    Procedure: LEFT TOTAL KNEE ARTHROPLASTY WITH SARAH NAVIGATION; Jose Muñoz MD;  [facility] MAIN OR        Social History     Occupational History   • Not on file   Tobacco Use   • Smoking status: Never Smoker   • Smokeless tobacco: Never Used   Substance and Sexual Activity   • Alcohol use: Yes     Comment: OCCASIONALLY   • Drug use: No   • Sexual activity: Defer    Social History     Social History Narrative   • Not on file        Family History   Problem Relation Age of Onset   • Hypertension Sister    • Malig Hyperthermia Neg Hx              Review of Systems: 14 point review of systems are remarkable for the pertinent positives listed in the chart by the patient the remainder negative    Review of Systems      Physical Exam: 64 y.o. female  General Appearance:    Alert, cooperative, in no acute distress                 Vitals:    04/08/19 1410   Temp: 97.3 °F (36.3 °C)   Weight: 92.1 kg (203 lb)   Height: 152.4 cm (60\")      Patient is alert and read ×3 no acute distress appears her above-listed at height weight and age.  Affect is normal respiratory rate is normal unlabored. Heart rate regular rate rhythm, sclera, dentition and hearing are normal for the purpose of this exam.    Ortho Exam Physical exam of the left shoulder reveals no overlying skin changes no lymphedema no lymphadenopathy.  Patient has active flexion 180 with mild symptoms abduction is similar external rotation is to 50 and internal rotation to the upper lumbar spine with " mild symptoms.  Patient has good rotator cuff strength 4+ over 5 with isometric strength testing with pain.  Patient has a positive impingement and a positive Brice sign.  Patient has good cervical range of motion which is full and asymptomatic no radicular symptoms.  Patient has a normal elbow exam.  Good distal pulses are presentPatient has pain with overhead activity and a positive Neer sign and a positive empty can sign  They have a positive drop arm any definitive painful arc      Large Joint Arthrocentesis: L subacromial bursa  Date/Time: 4/8/2019 2:30 PM  Consent given by: patient  Site marked: site marked  Timeout: Immediately prior to procedure a time out was called to verify the correct patient, procedure, equipment, support staff and site/side marked as required   Supporting Documentation  Indications: pain   Procedure Details  Location: shoulder - L subacromial bursa  Preparation: Patient was prepped and draped in the usual sterile fashion  Needle size: 22 G  Approach: posterior  Medications administered: 80 mg methylPREDNISolone acetate 80 MG/ML; 4 mL lidocaine (cardiac) 20 MG/ML  Patient tolerance: patient tolerated the procedure well with no immediate complications                Radiology:   AP, Scapular Y and Axillary Lateral of the left shoulder were ordered/reviewed to evauate shoulder pain.  Have no compared to films these show some acromioclavicular arthritis no other acute bony pathology is seen  Imaging Results (most recent)     Procedure Component Value Units Date/Time    XR Shoulder 2+ View Left [[id number]] Resulted:  04/08/19 1322     Updated:  04/08/19 1325    Impression:       Ordering physician's impression is located in the Encounter Note dated 04/08/19. X-ray performed in the DR room.          Assessment/Plan: Left shoulder pain I think this is more impingement think she would benefit from an injection as a diagnostic and therapeutic tool she fails to improve we will pursue other  means of testing  Cortisone Injection. See procedure note.  Cortisone Injection for DIAGNOSTIC and THERAPUTIC purposes.

## 2019-04-09 RX ORDER — METHYLPREDNISOLONE ACETATE 80 MG/ML
80 INJECTION, SUSPENSION INTRA-ARTICULAR; INTRALESIONAL; INTRAMUSCULAR; SOFT TISSUE
Status: COMPLETED | OUTPATIENT
Start: 2019-04-08 | End: 2019-04-08

## 2019-04-10 ENCOUNTER — TREATMENT (OUTPATIENT)
Dept: PHYSICAL THERAPY | Facility: CLINIC | Age: 64
End: 2019-04-10

## 2019-04-10 DIAGNOSIS — IMO0002 BURSITIS/TENDONITIS, SHOULDER: ICD-10-CM

## 2019-04-10 DIAGNOSIS — M25.512 ACUTE PAIN OF LEFT SHOULDER: Primary | ICD-10-CM

## 2019-04-10 PROCEDURE — 97140 MANUAL THERAPY 1/> REGIONS: CPT | Performed by: PHYSICAL THERAPIST

## 2019-04-10 PROCEDURE — 97035 APP MDLTY 1+ULTRASOUND EA 15: CPT | Performed by: PHYSICAL THERAPIST

## 2019-04-10 PROCEDURE — 97162 PT EVAL MOD COMPLEX 30 MIN: CPT | Performed by: PHYSICAL THERAPIST

## 2019-04-10 PROCEDURE — 97530 THERAPEUTIC ACTIVITIES: CPT | Performed by: PHYSICAL THERAPIST

## 2019-04-10 NOTE — PROGRESS NOTES
Orthopedic / Sports / Industrial Physical Therapy  Physical Therapy Initial Evaluation and Plan of Care    Patient Name: Risa Jimenez          :  1955  Referring Physician: Marie Ouzna MD  Diagnosis: Acute pain of left shoulder [M25.512]    Date of Evaluation: 4/10/2019  ______________________________________________________________________    Subjective Evaluation    History of Present Illness  Onset date: 2-3 months ago.  Mechanism of injury: Insidious onset - Carrying heavy bag on shoulder - Lift and move make-up bag -     Corizone injection helpful -     Pain  Location: Anterjior and lateral shoulder (L);  Moderately deep - Denies catching /. popping and denies NT-ing in LUE -   Quality: sharp  Alleviating factors: Cortizone injection; Mobic; No other pattern -   Exacerbated by: Pushing self up and reaching behind; (L) Side-ling; FELifting items of wt;  Pushing down;   Progression: improved    Hand dominance: right    Diagnostic Tests  Abnormal x-ray: AC Jt OA per MD note -     Treatments  Current treatment: injection treatment and medication  Patient Goals  Patient/family treatment goals: Pain alleviation; Normal mobility, strength, function and normal job w/o limitaitons -          ___________________________________________________  Objective       Postural Observations    Additional Postural Observation Details  Rounded shoulder posture w/ IR'ed UE's -       Palpation     Additional Palpation Details  Severely tender along LH Biceps / anterior shoulder;  Tender lateral subacromial region; Tender lateral deltoid w/ multiple trigger points (L) Shoulder -     Active Range of Motion     Additional Active Range of Motion Details  (L) Shoulder: FE Full w/ minimal discomfort;  ERB to C5-6 w/ pain anterior shoulder; IRB to GT (L) hip w/ severe pain Anterior shoulder (L);     Strength/Myotome Testing     Additional Strength Details  (L) Shoulder: Pain w/ Resisted ABDuction;  Pain anterior shoulder into  biceps w/ Empty can; ERS/IRS WF/NL w/o pain     Tests     Additional Tests Details  (L) Shoulder:  (+) Speeds Test; (+) Hemphill's; Pain w/ Empty Can, but pain wal anterior shoulder into biceps mm -        See Treatment Flow sheet for Exercises, Manual therapy, and modalities.   FUNCTIONAL ACTIVITIES: X 20 min  · TAPING / BRACING: K-Tape to 1) Unload LH Biceps; 2) Unload Shoulder ant,lat,post  · Jt protection, ADL modification; Posture and ; Ergonomic instructions; Instructions to do PFE regularly to prevent frozen shoulder -      ___________________________________________________  Assessment & Plan     Assessment  Assessment details: (L) Shoulder strain; Possible Labral / SLAP involvement > RTC   Pt may require further assessment is PT not successful -     PROBLEMS: Pain; Limited mobility / AROM; Weakness; Postural dysfunctoin; Intolerance to ADL;s and job-related activities -   PROGNOSIS: Good    GOALS:   SHORT TERM GOALS: 2 weeks:  1) HEP Initiated; 2) Pain decreased 50%:   3) AROM  increased:  4) Improved functional ability grossly;     LONG TERM GOALS: 4 weeks (or at time of DISCHARGE): 1) (I) HEP; 2) AROM WFL and pain free; 3) Strength / mobility to be able to perform all ADL's and job-related activities w/o restrictions;       Plan  Planned therapy interventions: flexibility, home exercise program, joint mobilization, manual therapy, neuromuscular re-education, postural training, soft tissue mobilization, strengthening, stretching and therapeutic activities (Modalities prn; Taping prn; )  Frequency: 2-3x week.  Duration in weeks: 4  Treatment plan discussed with: patient      ___________________________________________________  Manual Therapy:    10     mins  06754;   Therapeutic Exercise:    05     mins  66424;     Neuromuscular Gila:        mins  55223;   Therapeutic Activity:     20     mins  10473;     Ultrasound:     10     mins  53671;    Electrical Stimulation:        mins  13940 (  );  Dry Needling          mins self-pay   Gait Training:          mins  64053;  EVAL TIME:   25 mins    Timed Treatment:   45   mins                Total Treatment:     80   mins    PT SIGNATURE:   Girma Menendez, PT  DATE TREATMENT INITIATED: 4/10/2019  ___________________________________________________  Initial Certification  Certification Period: 7/9/2019  I certify that the therapy services are furnished while this patient is under my care.  The services outlined above are required by this patient, and will be reviewed every 90 days.     PHYSICIAN: ________________________________  DATE: ______  Marie Ozuna MD        Please sign and return via fax to [phone].. Thank you, [facility].  ______________________________________________________________________  [address] [place] [address]  Phone: [phone] Fax: [phone]

## 2019-04-15 ENCOUNTER — TREATMENT (OUTPATIENT)
Dept: PHYSICAL THERAPY | Facility: CLINIC | Age: 64
End: 2019-04-15

## 2019-04-15 DIAGNOSIS — IMO0002 BURSITIS/TENDONITIS, SHOULDER: ICD-10-CM

## 2019-04-15 DIAGNOSIS — R26.9 GAIT DISTURBANCE: ICD-10-CM

## 2019-04-15 DIAGNOSIS — M25.512 ACUTE PAIN OF LEFT SHOULDER: Primary | ICD-10-CM

## 2019-04-15 PROCEDURE — 97110 THERAPEUTIC EXERCISES: CPT | Performed by: PHYSICAL THERAPIST

## 2019-04-15 PROCEDURE — 97530 THERAPEUTIC ACTIVITIES: CPT | Performed by: PHYSICAL THERAPIST

## 2019-04-15 PROCEDURE — 97140 MANUAL THERAPY 1/> REGIONS: CPT | Performed by: PHYSICAL THERAPIST

## 2019-04-15 NOTE — PROGRESS NOTES
Physical Therapy Daily Progress Note    Patient Name: Risa Jimenez         :  1955  Referring Physician: Marie Ozuna MD    Subjective   Risa Jimenez reports: feeling much better after initial treatment - Tape very helpful in alleviating her shoulder pain - She notes much less pain and improved mobility and function -      Objective   Improved posturing LUE -   Very tender LH biceps into anterior shoulder; Ant/lat shoulder and especially lateral deltoid w/ multiple severe TP's -   AROM: Full FE, ERB, but limited and painful IRB , but near full IRB after MT w/o pain and much less-     See Exercise, Manual, and Modality Logs for complete treatment.     Functional / Therapeutic Activities:  10 min  · TAPING / BRACING: K-Tape to 1) Unload LH biceps; 2) Unload Shoulder (L); 3) Unload Deltoid insertion x 2 strips  · Jt protection, ADL modification; Posture and      Assessment/Plan   (L) Shoulder strain; Possible Labral / SLAP involvement > RTC   Pt may require further assessment is PT not successful -     Much improved AROM and decreased pain after MT today -   Taping very helpful w/ decreased pain and improving function and mobility     Progress strengthening /stabilization /functional activity       _________________________________________________  Manual Therapy:    15     mins  30308;  Therapeutic Exercise:    25     mins  32609;     Neuromuscular Gila:        mins  53670;    Therapeutic Activity:     10     mins  28413;     Gait Training:           mins  95413;     Ultrasound:     10     mins  80780;    Electrical Stimulation:    20     mins  43242 ( );  Dry Needling          mins self-pay    Timed Treatment:   60   mins                  Total Treatment:     90   mins    Girma Menendez PT  Physical Therapist

## 2019-04-17 ENCOUNTER — TREATMENT (OUTPATIENT)
Dept: PHYSICAL THERAPY | Facility: CLINIC | Age: 64
End: 2019-04-17

## 2019-04-17 DIAGNOSIS — M25.512 ACUTE PAIN OF LEFT SHOULDER: Primary | ICD-10-CM

## 2019-04-17 DIAGNOSIS — IMO0002 BURSITIS/TENDONITIS, SHOULDER: ICD-10-CM

## 2019-04-17 PROCEDURE — 97530 THERAPEUTIC ACTIVITIES: CPT | Performed by: PHYSICAL THERAPIST

## 2019-04-17 PROCEDURE — 97112 NEUROMUSCULAR REEDUCATION: CPT | Performed by: PHYSICAL THERAPIST

## 2019-04-17 PROCEDURE — 97110 THERAPEUTIC EXERCISES: CPT | Performed by: PHYSICAL THERAPIST

## 2019-04-24 ENCOUNTER — TREATMENT (OUTPATIENT)
Dept: PHYSICAL THERAPY | Facility: CLINIC | Age: 64
End: 2019-04-24

## 2019-04-24 DIAGNOSIS — IMO0002 BURSITIS/TENDONITIS, SHOULDER: ICD-10-CM

## 2019-04-24 DIAGNOSIS — M25.512 ACUTE PAIN OF LEFT SHOULDER: Primary | ICD-10-CM

## 2019-04-24 PROCEDURE — 97112 NEUROMUSCULAR REEDUCATION: CPT | Performed by: PHYSICAL THERAPIST

## 2019-04-24 PROCEDURE — 97530 THERAPEUTIC ACTIVITIES: CPT | Performed by: PHYSICAL THERAPIST

## 2019-04-24 PROCEDURE — 97110 THERAPEUTIC EXERCISES: CPT | Performed by: PHYSICAL THERAPIST

## 2019-04-24 NOTE — PROGRESS NOTES
Physical Therapy Daily Progress Note     Patient Name: Risa Jimenez         :  1955  Referring Physician: Marie Ozuna MD     Subjective   Risa Jimenez reports: feeling much better after initial treatment - Tape very helpful in alleviating her shoulder pain - She notes much less pain and improved mobility and function -  Feels like she is ready to stop therapy now - Only notes some mild discomfort with fixing her hair this AM -      Objective   Improved posturing LUE -   Much less tender LH biceps,  Ant/lat shoulder and lateral deltoid w/ minimal TP's -   AROM: Full FE, ERB, and much improved  IRB -   (-) Empty Can; (-) Drop Arm; (+) Speeds; (+) [place]'s Test (L)     See Exercise, Manual, and Modality Logs for complete treatment.-Updated HEP      Functional / Therapeutic Activities:  20 min  · TAPING / BRACING: K-Tape to 1) Unload LH biceps; 2) Unload Shoulder (L); 3) Unload Deltoid insertion x 2 strips  · FUNCTIONAL ASSESSMENT  · Jt protection, ADL modification; Posture and       Assessment/Plan   (L) Shoulder strain; Possible Labral / SLAP involvement > RTC      Much improved AROM and decreased pain after MT today -   Taping very helpful w/ decreased pain and improving function and mobility   Pt may benefit from continued PT, but she is pleased with progress -   Most goals met -      DC PT to HEP -  Pt to return if pain returns -   _________________________________________________  Manual Therapy:                 mins  95618;  Therapeutic Exercise:    30     mins  82160;     Neuromuscular Gila:   10     mins  99160;    Therapeutic Activity:      20     mins  93582;     Gait Training:                      mins  81057;     Ultrasound:                     10     mins  27902;    Electrical Stimulation:        mins  33001 ( );  Dry Needling                       mins self-pay     Timed Treatment:   70   mins                  Total Treatment:     80   mins     Girma Menendez, PT  Physical  Therapist

## 2019-05-13 ENCOUNTER — OFFICE VISIT (OUTPATIENT)
Dept: ORTHOPEDIC SURGERY | Facility: CLINIC | Age: 64
End: 2019-05-13

## 2019-05-13 VITALS — TEMPERATURE: 96.9 F | WEIGHT: 206 LBS | HEIGHT: 58 IN | BODY MASS INDEX: 43.24 KG/M2

## 2019-05-13 DIAGNOSIS — Z96.652 HISTORY OF TOTAL KNEE ARTHROPLASTY, LEFT: Primary | ICD-10-CM

## 2019-05-13 PROCEDURE — 73560 X-RAY EXAM OF KNEE 1 OR 2: CPT | Performed by: NURSE PRACTITIONER

## 2019-05-13 PROCEDURE — 99213 OFFICE O/P EST LOW 20 MIN: CPT | Performed by: NURSE PRACTITIONER

## 2019-05-13 NOTE — PATIENT INSTRUCTIONS
ASSESSMENT: Status post left total knee replacement with expected healing    PLAN: 1) Home PT exercises for quad strengthening, can order formal PT if needed, does not desire at present.    2) Continue with antibiotic prophylaxis with dental procedures for 2 yrs post total joint replacement.   3) Follow up as needed, call if you want to do formal PT.   5/13/2019  Patient was seen by CARMEN Meyer in the office today.     Do Daily THIGH exercises sitting up straight in a supportive chair, lean forward at the hips keeping the back as straight as possible, and in that forward leaning position, lift the thigh very slowly up and down.  Give assist with your hand under the knee to lift as needed. 15x on each thigh once a day, every day.

## 2019-05-13 NOTE — PROGRESS NOTES
"NAME: Risa Jimenez  : 1955   MRN: [id number]   DATE: 2019    CC: 1 1/2 years  Follow up status post total joint replacement with new issue of clunking.     SUBJECTIVE:    HPI: Is here today with c/o left knee \"clunk\" and weakness \"giving out\" lately, made worse by viral illness for the past 5 weeks.  Patient returns today for a follow up of total joint replacement. Patient reports doing well with no unusual complaints. Appears to be progressing appropriately.  HPI    This problem is new to this examiner.     Allergies:   Allergies   Allergen Reactions   • Moxifloxacin Other (See Comments)     HYPERTENSION   • Penicillins Rash       Medications:   Home Medications:  Current Outpatient Medications on File Prior to Visit   Medication Sig   • atorvastatin (LIPITOR) 80 MG tablet Take 80 mg by mouth Every Night.   • fluticasone (FLONASE) 50 MCG/ACT nasal spray 2 sprays into each nostril Daily.   • lisinopril (PRINIVIL,ZESTRIL) 10 MG tablet Take 10 mg by mouth Every Night.   • meloxicam (MOBIC) 15 MG tablet Take  by mouth Daily.   • Multiple Vitamins-Minerals (MULTIVITAMIN WITH MINERALS) tablet tablet Take 1 tablet by mouth Daily.   • pseudoephedrine-guaifenesin (MUCINEX D)  MG per 12 hr tablet Take 1 tablet by mouth 2 (Two) Times a Day As Needed.   • sertraline (ZOLOFT) 50 MG tablet Take 75 mg by mouth Daily.   • traMADol (ULTRAM) 50 MG tablet TK 1 TO 2 TS PO BID PRN P     No current facility-administered medications on file prior to visit.        Current Medications:  Scheduled Meds:  Continuous Infusions:  No current facility-administered medications for this visit.   PRN Meds:.    I have reviewed the patient's medical history in detail and updated the computerized patient record.  Review and summarization of old records include:    Past Medical History:   Diagnosis Date   • Allergic    • Arthritis    • High cholesterol    • Hypertension    • Left knee pain    • Left leg pain    • Sciatic leg pain     " LEFT        Past Surgical History:   Procedure Laterality Date   • BACK SURGERY  2012    Micro-Discectomy L5-S1 - Boom Smith MD   • BREAST BIOPSY     • CHOLECYSTECTOMY     • JOINT REPLACEMENT Right 2015   • SINUS SURGERY     • TOTAL KNEE ARTHROPLASTY Left 11/15/2017    Procedure: LEFT TOTAL KNEE ARTHROPLASTY WITH SARAH NAVIGATION; Jose Muñoz MD;  [facility] MAIN OR        Social History     Occupational History   • Not on file   Tobacco Use   • Smoking status: Never Smoker   • Smokeless tobacco: Never Used   Substance and Sexual Activity   • Alcohol use: Yes     Comment: OCCASIONALLY   • Drug use: No   • Sexual activity: Defer    Social History     Social History Narrative   • Not on file        Family History   Problem Relation Age of Onset   • Hypertension Sister    • Malig Hyperthermia Neg Hx        ROS: 14 point review of systems was performed and was negative except for documented findings in HPI and today's encounter.     Allergies:   Allergies   Allergen Reactions   • Moxifloxacin Other (See Comments)     HYPERTENSION   • Penicillins Rash     Constitutional:  Denies fever, shaking or chills   Eyes:  Denies change in visual acuity   HENT:  Denies nasal congestion or sore throat   Respiratory:  Denies cough or shortness of breath   Cardiovascular:  Denies chest pain or severe LE edema   GI:  Denies abdominal pain, nausea, vomiting, bloody stools or diarrhea   Musculoskeletal:  Denies numbness tingling or loss of motor function except as outlined above in history of present illness.  : Denies painful urination or hematuria  Integument:  Denies rash, lesion or ulceration   Neurologic:  Denies headache or focal weakness  Endocrine:  Denies lymphadenopathy  Psych:  Denies confusion or change in mental status   Hem:  Denies active bleeding        OBJECTIVE:     Physical Exam:  Vital Signs:    Wt Readings from Last 3 Encounters:   05/13/19 93.4 kg (206 lb)   04/08/19 92.1 kg (203 lb)   05/02/18 86.2 kg  "(190 lb)     Ht Readings from Last 3 Encounters:   05/13/19 147.3 cm (58\")   04/08/19 152.4 cm (60\")   05/02/18 152.4 cm (60\")     Body mass index is 43.05 kg/m².  Facility age limit for growth percentiles is 20 years.  Vitals:    05/13/19 0901   Temp: 96.9 °F (36.1 °C)       Constitutional: Awake alert and oriented x3, well developed, well nourished, no acute distress, non-toxic appearance.  HEENT:  Normocephalic, Atraumatic, Bilateral external ears normal, Oropharynx moist, No oral exudates, Nose normal.   Respiratory:  No respiratory distress, No wheezing  CV: No palpitations  Vascular:  Brisk cap refill, Intact distal pulses, No cyanosis, all compartments soft with no signs or symptoms of compartment syndrome or DVT.  Neurologic: Sensation grossly intact to light touch throughout the involved extremity and bilaterally symmetric, deep tendon reflexes are 2+ and bilaterally symmetric, No focal deficits noted.   Neck:  Normal range of motion, No tenderness, Supple, Negative Spurlings.  Integument: Well hydrated, no rash, warm, dry, no lesions or ulceration.   Musculoskeletal:  Affected extremity post op incision is healed appropriately. No sign of infection. Range of motion is progressing as expected. The calf is soft and nontender with a negative Homans sign. Distal pulses intact. Normal amount of swelling present for recovery time.     DIAGNOSTIC STUDIES  Imaging done today, images were personally viewed and discussed with the patient:  Indication, findings and comparison: 2V AP&Lat of the operative joint viewed for evaluation of past joint replacement and discussed with patient. They demonstrate a well positioned, well aligned total joint replacement without complicating factors noted. In comparison with the film from the last office visit, there has been no alignment change.    ASSESSMENT: Status post left total knee replacement with expected healing    PLAN: 1) Home PT exercises for quad strengthening, can " order formal PT if needed, does not desire at present.    2) Continue with antibiotic prophylaxis with dental procedures for 2 yrs post total joint replacement.   3) Follow up as needed, call if you want to do formal PT.   5/13/2019  Patient was seen by CARMEN Meyer in the office today.

## 2020-03-26 ENCOUNTER — DOCUMENTATION (OUTPATIENT)
Dept: PHYSICAL THERAPY | Facility: CLINIC | Age: 65
End: 2020-03-26

## 2020-03-26 DIAGNOSIS — IMO0002 BURSITIS/TENDONITIS, SHOULDER: ICD-10-CM

## 2020-03-26 DIAGNOSIS — M25.512 ACUTE PAIN OF LEFT SHOULDER: Primary | ICD-10-CM

## 2020-03-26 NOTE — PROGRESS NOTES
Orthopedic / Sports / Industrial Physical Therapy    Discharge Summary  Discharge Summary from Physical Therapy Report    Patient: Risa Jimenez     :  1955  Referring Physician:  Marie Ozuna MD    Dates  PT visit: 4/10/2019 thru 4/10/2019   Number of Visits: 04    Discharge Status of Patient: At her last appointment, Brayden noted feeling much better after initial treatment - Tape very helpful in alleviating her shoulder pain - She notes much less pain and improved mobility and function -  Feels like she is ready to stop therapy now - Only notes some mild discomfort with fixing her hair this AM     Goals: All Met    Prognosis:  Good      DISCHARGE PLAN:  DC PT to HEP    Date of Discharge:  2019      Girma Menendez PT  Physical Therapist  ______________________________________________________________________  [address] [place] [address]  Phone: [phone] Fax: [phone]

## 2021-04-15 NOTE — PROGRESS NOTES
5 PAGE(S)  TO/FROM [facility]  WITH PROVIDER NOTES  [x] Received   [] Given   [] Faxed   [] Mailed  04/15/21 for provider [x] Dr. Olea   [] Dr. Multani   [] Dr. Thomas   [] Dr. Jones   [] Laura Augustine NP   [] [facility] Provider     Does parent need a copy of form,or do they want it mailed, picked up or faxed? Fax #:     []  PLACED IN PROVIDERS FOLDER AT CHECK OUT 3  []  PLACED IN PROVIDERS BASKET  []  PLACED IN [facility] BASKET  []  OTHER -    Physical Therapy Daily Progress Note     Patient Name: Risa Jimenez         :  1955  Referring Physician: Marie Ozuna MD     Subjective   Risa Jimenez reports: feeling much better after initial treatment - Tape very helpful in alleviating her shoulder pain - She notes much less pain and improved mobility and function -       Objective   Improved posturing LUE -   Very tender LH biceps into anterior shoulder; [place] shoulder and especially lateral deltoid w/ multiple severe TP's -   AROM: Full FE, ERB, but limited and painful IRB , but near full IRB after MT w/o pain and much less-      See Exercise, Manual, and Modality Logs for complete treatment.     Functional / Therapeutic Activities:  10 min  · TAPING / BRACING: K-Tape to 1) Unload LH biceps; 2) Unload Shoulder (L); 3) Unload Deltoid insertion x 2 strips  · Jt protection, ADL modification; Posture and       Assessment/Plan   (L) Shoulder strain; Possible Labral / SLAP involvement > RTC   Pt may require further assessment is PT not successful -      Much improved AROM and decreased pain after MT today -   Taping very helpful w/ decreased pain and improving function and mobility      Progress strengthening /stabilization /functional activity     _________________________________________________  Manual Therapy:            10     mins  35807;  Therapeutic Exercise:    30     mins  42311;     Neuromuscular Gila:   10     mins  46845;    Therapeutic Activity:      10     mins  03221;     Gait Training:                      mins  38441;     Ultrasound:                     10     mins  53037;    Electrical Stimulation:    20     mins  15504 ( );  Dry Needling                       mins self-pay     Timed Treatment:   70   mins                  Total Treatment:     100   mins     Girma Menendez PT  Physical Therapist

## 2021-08-18 ENCOUNTER — TRANSCRIBE ORDERS (OUTPATIENT)
Dept: ADMINISTRATIVE | Facility: HOSPITAL | Age: 66
End: 2021-08-18

## 2021-08-18 DIAGNOSIS — Z78.0 ASYMPTOMATIC MENOPAUSAL STATE: Primary | ICD-10-CM

## 2022-07-13 ENCOUNTER — OFFICE VISIT (OUTPATIENT)
Dept: FAMILY MEDICINE CLINIC | Facility: CLINIC | Age: 67
End: 2022-07-13

## 2022-07-13 VITALS
HEIGHT: 58 IN | TEMPERATURE: 96.9 F | BODY MASS INDEX: 43.7 KG/M2 | SYSTOLIC BLOOD PRESSURE: 120 MMHG | HEART RATE: 81 BPM | OXYGEN SATURATION: 96 % | DIASTOLIC BLOOD PRESSURE: 78 MMHG | WEIGHT: 208.2 LBS

## 2022-07-13 DIAGNOSIS — F41.9 ANXIETY: ICD-10-CM

## 2022-07-13 DIAGNOSIS — R10.13 ABDOMINAL DISCOMFORT, EPIGASTRIC: ICD-10-CM

## 2022-07-13 DIAGNOSIS — R73.03 PREDIABETES: ICD-10-CM

## 2022-07-13 DIAGNOSIS — R53.83 FATIGUE, UNSPECIFIED TYPE: ICD-10-CM

## 2022-07-13 DIAGNOSIS — E66.01 CLASS 3 SEVERE OBESITY WITH SERIOUS COMORBIDITY AND BODY MASS INDEX (BMI) OF 40.0 TO 44.9 IN ADULT, UNSPECIFIED OBESITY TYPE: ICD-10-CM

## 2022-07-13 DIAGNOSIS — I10 PRIMARY HYPERTENSION: ICD-10-CM

## 2022-07-13 DIAGNOSIS — Z12.11 ENCOUNTER FOR SCREENING FOR MALIGNANT NEOPLASM OF COLON: ICD-10-CM

## 2022-07-13 DIAGNOSIS — M25.50 ARTHRALGIA OF MULTIPLE JOINTS: ICD-10-CM

## 2022-07-13 DIAGNOSIS — Z00.00 ENCOUNTER FOR ANNUAL PHYSICAL EXAM: Primary | ICD-10-CM

## 2022-07-13 DIAGNOSIS — E78.2 MIXED HYPERLIPIDEMIA: ICD-10-CM

## 2022-07-13 PROBLEM — M51.36 BULGE OF LUMBAR DISC WITHOUT MYELOPATHY: Status: ACTIVE | Noted: 2022-07-13

## 2022-07-13 PROBLEM — M79.609 EXTREMITY PAIN: Status: ACTIVE | Noted: 2022-07-13

## 2022-07-13 PROBLEM — M51.369 DEGENERATION OF LUMBAR INTERVERTEBRAL DISC: Status: ACTIVE | Noted: 2017-11-08

## 2022-07-13 PROBLEM — M51.36 DEGENERATION OF LUMBAR INTERVERTEBRAL DISC: Status: ACTIVE | Noted: 2017-11-08

## 2022-07-13 PROBLEM — M51.369 BULGE OF LUMBAR DISC WITHOUT MYELOPATHY: Status: ACTIVE | Noted: 2022-07-13

## 2022-07-13 PROCEDURE — 99387 INIT PM E/M NEW PAT 65+ YRS: CPT | Performed by: NURSE PRACTITIONER

## 2022-07-13 RX ORDER — CLARITHROMYCIN 250 MG/1
TABLET, FILM COATED ORAL
COMMUNITY
End: 2022-07-13

## 2022-07-13 RX ORDER — SERTRALINE HYDROCHLORIDE 100 MG/1
TABLET, FILM COATED ORAL
COMMUNITY
End: 2022-08-17 | Stop reason: ALTCHOICE

## 2022-07-13 RX ORDER — LISINOPRIL 10 MG/1
10 TABLET ORAL NIGHTLY
Qty: 90 TABLET | Refills: 2 | Status: SHIPPED | OUTPATIENT
Start: 2022-07-13 | End: 2023-01-18

## 2022-07-13 RX ORDER — BUPROPION HYDROCHLORIDE 150 MG/1
TABLET, EXTENDED RELEASE ORAL
Qty: 30 TABLET | Refills: 1 | OUTPATIENT
Start: 2022-07-13

## 2022-07-13 RX ORDER — AZITHROMYCIN 250 MG/1
TABLET, FILM COATED ORAL
COMMUNITY
End: 2022-07-13

## 2022-07-13 RX ORDER — BUPROPION HYDROCHLORIDE 150 MG/1
150 TABLET, EXTENDED RELEASE ORAL 2 TIMES DAILY
Qty: 30 TABLET | Refills: 1 | Status: SHIPPED | OUTPATIENT
Start: 2022-07-13 | End: 2022-07-18 | Stop reason: SDUPTHER

## 2022-07-13 RX ORDER — PREDNISONE 10 MG/1
TABLET ORAL
COMMUNITY
End: 2022-07-13

## 2022-07-13 RX ORDER — CELECOXIB 200 MG/1
200 CAPSULE ORAL DAILY
Qty: 30 CAPSULE | Refills: 1 | Status: SHIPPED | OUTPATIENT
Start: 2022-07-13 | End: 2022-08-17 | Stop reason: SDUPTHER

## 2022-07-13 NOTE — PATIENT INSTRUCTIONS
Add Wellbutrin daily.  Continue to monitor your blood pressure periodically and record results.  Continue to work on healthy diet and exercise.  No other anti-inflammatory medications while taking Celebrex (no Aleve, Ibuprofen, Naproxen, etc.).  Take Celebrex with food.   Continue Pepcid twice daily.  Follow up pending lab results.  Follow up in 1 month, or sooner if symptoms persist or worsen.

## 2022-07-13 NOTE — TELEPHONE ENCOUNTER
Rx Refill Note  Requested Prescriptions     Pending Prescriptions Disp Refills   • buPROPion SR (WELLBUTRIN SR) 150 MG 12 hr tablet [Pharmacy Med Name: bupropion HCl  mg tablet,12 hr sustained-release] 30 tablet 1     Sig: TAKE ONE TABLET BY MOUTH TWICE DAILY      Last office visit with prescribing clinician: 7/13/2022      Next office visit with prescribing clinician: 8/10/2022     Last refill            Lissett Oakes MA  07/13/22, 15:05 EDT

## 2022-07-13 NOTE — PROGRESS NOTES
Subjective   Risa Jimenez is a 67 y.o. female.     Chief Complaint   Patient presents with   • Annual Exam   • Hypertension     Pain all over body       History of Present Illness   New patient, here to establish care; previous PCP Dr. Pham; patient presents for CPE with fasting labs; healthy diet; not much exercise, some gardening; regular dental visits; last eye exam within last year, about 6-8 months ago, had Lasik in past, wears glasses to drive or close work; some decreased hearing with background noise, had recent hearing test at [facility]; immunizations: had shingles, pneumonia, flu, [facility]; no recent female care; not sexually active; no recent PAP; last mammo 2017; declines mammo; no family history of breast cancer; colonoscopy 2009, per Dr. Muñoz; needs EGD and colonoscopy; referral to GI; no family history of colon cancer; has had uterine polyp removed in past.    F/U HTN: takes Lisinopril daily; monitors BP on occasion; no headaches; no orthostasis; no swelling  Blood sugar has been running 140s at times when has checked.    F/U Hyperlipidemia: has been on Atorvastatin in past, but stopped taking due to aching; no other medications for cholesterol in past; thinks unnatural foods contribute to cholesterol; eats grass fed beef, no antibiotic chicken, wild caught seafood, avoids fast food, etc; overall, eats healthy; gets hungry at night and will snack; has lost weight using Weight [employer] in past; considering [facility] loss program at [facility].    Also, c/o fatigue and pain all over; has been on Meloxicam 7.5 mg and having some epigastric pain; recently had discomfort across upper abdomen, so stopped Meloxicam; takes Tylenol 1000 mg 1-2 times daily as needed and helps some; also takes Tramadol very rarely; back will get aggravated after working in the yard and will take Tramadol when pain gets really bad; typically Tylenol and Meloxicam help; has taken Amitriptyline as  needed in past and would help left sciatic nerve pain, but would make really sleepy; overall, sciatic nerve pain better than in past since had surgery per Dr. Nolan; had acute rupture of disc and could not urinate or have BM; had injured back in past when caring for father; has pain in lower back/SI region with prolonged walking or standing and then will go from bilateral lower back and down to buttocks; has discomfort in multiple joints; sister and mother had RA; every joint hurts; may have fibromyalgia; pain started when mother passed away; was very close to mother.    F/U anxiety: takes Sertraline daily and helps; has been on medication since menopause years ago; increased dose to 100 mg about 6 months ago; has anxiety, but also has some depression; has tried to come off medication and does not tolerate; will get ear snaps in head when tries to decrease dose; gets agitated and irritable; has tried Effexor in past and was too much; made a nervous wreck and all senses were heightened; has lack of energy; see PHQ-2 and YARED-7; no SI/HI.    Has had bilateral knee replacement per Dr. Muñoz in past.    Sees ENT Dr. Brice; has had nasal surgery x2; when has flares of allergies, will easily get sinus infection; has taken Prednisone in past for swelling in sinuses and would help things to drain; does nasal irrigration regularly and helps; also uses Flonase and helps.    The following portions of the patient's history were reviewed and updated as appropriate: allergies, current medications, past family history, past medical history, past social history, past surgical history and problem list.    Current Outpatient Medications on File Prior to Visit   Medication Sig   • Cholecalciferol (VITAMIN D-3 PO) Take  by mouth.   • fluticasone (FLONASE) 50 MCG/ACT nasal spray 2 sprays into each nostril Daily.   • Loratadine (Claritin) 10 MG capsule Take 1 capsule by mouth Daily.   • Multiple Vitamins-Minerals (MULTIVITAMIN WITH  MINERALS) tablet tablet Take 1 tablet by mouth Daily.   • sertraline (ZOLOFT) 100 MG tablet sertraline 100 mg tablet   Take 1 tablet(s) every day by oral route.     No current facility-administered medications on file prior to visit.        Past Medical History:   Diagnosis Date   • Allergic rhinitis    • Anxiety    • Arthritis    • Chronic pain    • Chronic pain of left knee 5/2/2017   • High cholesterol    • History of total knee arthroplasty, left 5/2/2018   • Hypertension    • Left knee pain    • Left leg pain    • Prediabetes    • S/P total knee arthroplasty, left 11/28/2017   • Sciatic leg pain     LEFT   • Status post total left knee replacement 1/3/2018       Past Surgical History:   Procedure Laterality Date   • BACK SURGERY  2012    Micro-Discectomy L5-S1 - Boom Smith MD   • BREAST BIOPSY Right 2000    benign--fibrocystic   • CHOLECYSTECTOMY  2002   • COLONOSCOPY  2009   • JOINT REPLACEMENT Right 2015   • LASIK  1999   • SINUS SURGERY  2005    and 2010   • TOTAL KNEE ARTHROPLASTY Left 11/15/2017    Procedure: LEFT TOTAL KNEE ARTHROPLASTY WITH SARAH NAVIGATION; Jose Muñoz MD;  [facility] OR   • TOTAL KNEE ARTHROPLASTY Right 2015       Family History   Problem Relation Age of Onset   • Hypertension Mother    • Arthritis Mother         Rheumatoid dx at 82   • Rheum arthritis Mother    • Hypertension Father    • Hypertension Sister    • Arthritis Sister         Dx 63 2022   • Rheum arthritis Sister    • Malig Hyperthermia Neg Hx        Social History     Socioeconomic History   • Marital status: Single   Tobacco Use   • Smoking status: Never Smoker   • Smokeless tobacco: Never Used   Substance and Sexual Activity   • Alcohol use: Yes     Alcohol/week: 1.0 standard drink     Types: 1 Glasses of wine per week     Comment: Rare   • Drug use: No   • Sexual activity: Never       Review of Systems   Constitutional: Positive for fatigue. Negative for appetite change, chills, fever, unexpected weight  "gain (gradual weight gain over time) and unexpected weight loss.   HENT: Negative for ear pain, sinus pressure, sore throat and trouble swallowing.    Eyes: Negative for blurred vision.   Respiratory: Negative for cough, chest tightness and shortness of breath.    Cardiovascular: Negative for chest pain and palpitations.   Gastrointestinal: Negative for blood in stool, constipation, diarrhea and GERD. Abdominal pain: see HPI; achy epigastric pain. Indigestion: see HPI, no heartburn or indigestion.   Endocrine: Positive for heat intolerance. Negative for cold intolerance and polydipsia.   Genitourinary: Negative for dysuria and frequency.   Musculoskeletal: Back pain: see HPI.   Skin: Negative for rash and skin lesions.   Neurological: Negative for syncope and weakness.   Hematological: Does not bruise/bleed easily.   Psychiatric/Behavioral: Negative for suicidal ideas.       PHQ-2 Depression Screening  Little interest or pleasure in doing things? 0-->not at all   Feeling down, depressed, or hopeless? 0-->not at all   PHQ-2 Total Score 0       YARED-7 anxiety score: 7    Objective   Vitals:    07/13/22 1315   BP: 120/78   BP Location: Right arm   Patient Position: Sitting   Pulse: 81   Temp: 96.9 °F (36.1 °C)   SpO2: 96%   Weight: 94.4 kg (208 lb 3.2 oz)   Height: 147.3 cm (57.99\")     Body mass index is 43.53 kg/m².    Physical Exam  Vitals and nursing note reviewed.   Constitutional:       General: She is not in acute distress.     Appearance: She is well-developed and well-groomed. She is not diaphoretic.   HENT:      Head: Normocephalic and atraumatic.      Jaw: No tenderness or pain on movement.      Right Ear: External ear normal. No decreased hearing noted. Right ear middle ear effusion: mild. Tympanic membrane is not erythematous.      Left Ear: External ear normal. No decreased hearing noted. Left ear middle ear effusion: mild. Tympanic membrane is not erythematous.      Nose: Nose normal.      Right Sinus: No " maxillary sinus tenderness or frontal sinus tenderness.      Left Sinus: No maxillary sinus tenderness or frontal sinus tenderness.      Mouth/Throat:      Mouth: Mucous membranes are moist.      Pharynx: No oropharyngeal exudate or posterior oropharyngeal erythema.   Eyes:      Extraocular Movements: Extraocular movements intact.      Conjunctiva/sclera: Conjunctivae normal.      Pupils: Pupils are equal, round, and reactive to light.   Neck:      Thyroid: No thyromegaly.      Vascular: No carotid bruit.      Trachea: No tracheal deviation.   Cardiovascular:      Rate and Rhythm: Normal rate and regular rhythm.      Pulses: Normal pulses.      Heart sounds: Normal heart sounds. No murmur heard.  Pulmonary:      Effort: Pulmonary effort is normal. No respiratory distress.      Breath sounds: Normal breath sounds.   Abdominal:      General: Bowel sounds are normal.      Palpations: Abdomen is soft. There is no hepatomegaly or splenomegaly.      Tenderness: There is no abdominal tenderness. There is no guarding.   Musculoskeletal:         General: Normal range of motion.      Cervical back: Normal range of motion and neck supple. No bony tenderness.      Thoracic back: No bony tenderness.      Lumbar back: No bony tenderness. Negative right straight leg raise test and negative left straight leg raise test.      Right hip: Normal range of motion.      Left hip: Normal range of motion.      Right lower leg: No edema.      Left lower leg: No edema.   Lymphadenopathy:      Cervical: No cervical adenopathy.   Skin:     General: Skin is warm and dry.      Findings: No rash.   Neurological:      Mental Status: She is alert and oriented to person, place, and time.      Cranial Nerves: No cranial nerve deficit.      Motor: Motor function is intact.      Coordination: Coordination normal.      Gait: Gait normal.      Deep Tendon Reflexes: Reflexes are normal and symmetric.   Psychiatric:         Mood and Affect: Mood normal.          Behavior: Behavior normal.         Thought Content: Thought content normal.         Cognition and Memory: Cognition normal.         Judgment: Judgment normal.         Lab Results   Component Value Date    WBC 6.4 07/13/2022    RBC 4.15 07/13/2022    HGB 12.8 07/13/2022    HCT 38.4 07/13/2022    MCV 93 07/13/2022    MCH 30.8 07/13/2022    MCHC 33.3 07/13/2022    RDW 12.8 07/13/2022    RDWSD 44.6 11/07/2017    MPV 9.5 11/07/2017     07/13/2022    NEUTRORELPCT 64 07/13/2022    LYMPHORELPCT 25 07/13/2022    MONORELPCT 7 07/13/2022    EOSRELPCT 3 07/13/2022    BASORELPCT 1 07/13/2022    NEUTROABS 4.0 07/13/2022    LYMPHSABS 1.6 07/13/2022    MONOSABS 0.5 07/13/2022    EOSABS 0.2 07/13/2022    BASOSABS 0.1 07/13/2022     Lab Results   Component Value Date    GLUCOSE 102 (H) 07/13/2022    BUN 22 07/13/2022    CREATININE 0.93 07/13/2022    EGFRIFNONA 83 11/16/2017    BCR 24 07/13/2022    K 4.7 07/13/2022    CO2 23 07/13/2022    CALCIUM 9.6 07/13/2022    PROTENTOTREF 7.2 07/13/2022    ALBUMIN 4.6 07/13/2022    LABIL2 1.8 07/13/2022    AST 19 07/13/2022    ALT 22 07/13/2022      Lab Results   Component Value Date    LABPH 7.5 05/12/2015    COLORU Yellow 11/07/2017    CLARITYU Clear 11/07/2017    LEUKOCYTESUR Negative 11/07/2017    GLUCOSEU Negative 11/07/2017    BLOODU Negative 11/07/2017    BILIRUBINUR Negative 11/07/2017    NITRITEU Negative 11/07/2017          Assessment    Problem List Items Addressed This Visit     Hypertension    Current Assessment & Plan     Hypertension is stable.  Weight loss.  Regular aerobic exercise.  Continue current medications.  Ambulatory blood pressure monitoring.  Blood pressure will be reassessed in 4 weeks.  Continue Lisinopril daily.           Relevant Medications    lisinopril (PRINIVIL,ZESTRIL) 10 MG tablet    Other Relevant Orders    Lipid Panel With LDL / HDL Ratio (Completed)    Comprehensive Metabolic Panel (Completed)    Mixed hyperlipidemia    Current Assessment & Plan      Continue to work on healthy diet and exercise.           Relevant Orders    Lipid Panel With LDL / HDL Ratio (Completed)    Comprehensive Metabolic Panel (Completed)    Prediabetes    Current Assessment & Plan     Continue to work on decreasing intake of carbs/sugars in diet and increasing exercise.           Relevant Orders    Hemoglobin A1c    Anxiety    Current Assessment & Plan     Continue Sertraline daily.  Add Wellbutrin daily.           Relevant Medications    buPROPion SR (Wellbutrin SR) 150 MG 12 hr tablet    Arthralgia of multiple joints    Relevant Medications    celecoxib (CeleBREX) 200 MG capsule    Other Relevant Orders    IRENA With / DsDNA, RNP, Sjogrens A / B, Chamberlain (Completed)    CBC & Differential (Completed)    Rheumatoid Factor (Completed)    Uric Acid (Completed)    C-reactive Protein (Completed)    Sedimentation Rate (Completed)    Abdominal discomfort, epigastric    Current Assessment & Plan     Start Pepcid twice daily.           Relevant Orders    Comprehensive Metabolic Panel (Completed)    CBC & Differential (Completed)    Lipase (Completed)    Ambulatory Referral to Gastroenterology    Fatigue    Relevant Orders    Comprehensive Metabolic Panel (Completed)    CBC & Differential (Completed)    TSH Rfx On Abnormal To Free T4 (Completed)    Class 3 severe obesity with serious comorbidity and body mass index (BMI) of 40.0 to 44.9 in adult (HCC)    Current Assessment & Plan     Patient's (Body mass index is 43.53 kg/m².) indicates that they are morbidly obese (BMI > 40 or > 35 with obesity - related health condition) with health conditions that include hypertension, dyslipidemias and osteoarthritis . Weight is unchanged. BMI is is above average; BMI management plan is completed. We discussed portion control and increasing exercise.   Decrease snacking in evening.             Other Visit Diagnoses     Encounter for annual physical exam    -  Primary    Relevant Orders    Lipid Panel With  LDL / HDL Ratio (Completed)    Comprehensive Metabolic Panel (Completed)    IRENA With / DsDNA, RNP, Sjogrens A / B, Chamberlain (Completed)    CBC & Differential (Completed)    Rheumatoid Factor (Completed)    Uric Acid (Completed)    C-reactive Protein (Completed)    Sedimentation Rate (Completed)    TSH Rfx On Abnormal To Free T4 (Completed)    Encounter for screening for malignant neoplasm of colon        Relevant Orders    Ambulatory Referral to Gastroenterology          Return in about 1 month (around 8/13/2022) for Recheck.or sooner if symptoms persist or worsen.  Impression: Health maintenance visit.  Currently, eats a healthy diet and has an inadequate exercise routine.  Cervical cancer screening: pt declines PAP.  Breast cancer screening: pt declined mammo.   Colorectal cancer screening: referred for colonoscopy.  Screening lab work includes: CMP, lipid.  Immunizations: pt declines any additional immunizations; risks and benefits of immunizations were discussed with patient.  Advice and education were given regarding nutrition and aerobic exercise.  Will consider [facility] program for weight loss pending lab results.  Will try Celebrex instead of Meloxicam due to epigastric discomfort; pt tried Pepcid last night and helped symptoms; pt instructed to start Pepcid twice daily or Omeprazole daily.  Mood is not well controlled; will continue Sertraline daily and add Wellbutrin to see if helps; discussed AE/BBW with Wellbutrin.       COVID-19 Precautions - Patient was compliant in wearing a mask. When I saw the patient, I used appropriate personal protective equipment (PPE) including mask, gloves, and eye shield (standard procedure).  Hand hygiene was completed before and after seeing the patient.

## 2022-07-14 ENCOUNTER — PATIENT ROUNDING (BHMG ONLY) (OUTPATIENT)
Dept: FAMILY MEDICINE CLINIC | Facility: CLINIC | Age: 67
End: 2022-07-14

## 2022-07-14 PROBLEM — R73.03 PREDIABETES: Status: ACTIVE | Noted: 2022-07-14

## 2022-07-14 PROBLEM — E78.00 HIGH CHOLESTEROL: Status: ACTIVE | Noted: 2022-07-14

## 2022-07-14 PROBLEM — R10.13 ABDOMINAL DISCOMFORT, EPIGASTRIC: Status: ACTIVE | Noted: 2022-07-14

## 2022-07-14 PROBLEM — E78.00 HIGH CHOLESTEROL: Status: RESOLVED | Noted: 2022-07-14 | Resolved: 2022-07-14

## 2022-07-14 PROBLEM — M25.562 CHRONIC PAIN OF LEFT KNEE: Status: RESOLVED | Noted: 2017-05-02 | Resolved: 2022-07-14

## 2022-07-14 PROBLEM — Z96.652 STATUS POST TOTAL LEFT KNEE REPLACEMENT: Status: RESOLVED | Noted: 2018-01-03 | Resolved: 2022-07-14

## 2022-07-14 PROBLEM — E66.01 CLASS 3 SEVERE OBESITY WITH SERIOUS COMORBIDITY AND BODY MASS INDEX (BMI) OF 40.0 TO 44.9 IN ADULT: Status: ACTIVE | Noted: 2022-07-14

## 2022-07-14 PROBLEM — M25.50 ARTHRALGIA OF MULTIPLE JOINTS: Status: ACTIVE | Noted: 2022-07-14

## 2022-07-14 PROBLEM — Z96.652 HISTORY OF TOTAL KNEE ARTHROPLASTY, LEFT: Status: RESOLVED | Noted: 2018-05-02 | Resolved: 2022-07-14

## 2022-07-14 PROBLEM — Z96.652 S/P TOTAL KNEE ARTHROPLASTY, LEFT: Status: RESOLVED | Noted: 2017-11-28 | Resolved: 2022-07-14

## 2022-07-14 PROBLEM — J30.9 ALLERGIC RHINITIS: Status: ACTIVE | Noted: 2022-07-14

## 2022-07-14 PROBLEM — G89.29 CHRONIC PAIN OF LEFT KNEE: Status: RESOLVED | Noted: 2017-05-02 | Resolved: 2022-07-14

## 2022-07-14 PROBLEM — R53.83 FATIGUE: Status: ACTIVE | Noted: 2022-07-14

## 2022-07-14 PROBLEM — E66.813 CLASS 3 SEVERE OBESITY WITH SERIOUS COMORBIDITY AND BODY MASS INDEX (BMI) OF 40.0 TO 44.9 IN ADULT: Status: ACTIVE | Noted: 2022-07-14

## 2022-07-14 PROBLEM — F41.9 ANXIETY: Status: ACTIVE | Noted: 2022-07-14

## 2022-07-14 LAB
ALBUMIN SERPL-MCNC: 4.6 G/DL (ref 3.8–4.8)
ALBUMIN/GLOB SERPL: 1.8 {RATIO} (ref 1.2–2.2)
ALP SERPL-CCNC: 83 IU/L (ref 44–121)
ALT SERPL-CCNC: 22 IU/L (ref 0–32)
ANA SER QL: NEGATIVE
AST SERPL-CCNC: 19 IU/L (ref 0–40)
BASOPHILS # BLD AUTO: 0.1 X10E3/UL (ref 0–0.2)
BASOPHILS NFR BLD AUTO: 1 %
BILIRUB SERPL-MCNC: 0.3 MG/DL (ref 0–1.2)
BUN SERPL-MCNC: 22 MG/DL (ref 8–27)
BUN/CREAT SERPL: 24 (ref 12–28)
CALCIUM SERPL-MCNC: 9.6 MG/DL (ref 8.7–10.3)
CHLORIDE SERPL-SCNC: 103 MMOL/L (ref 96–106)
CHOLEST SERPL-MCNC: 265 MG/DL (ref 100–199)
CO2 SERPL-SCNC: 23 MMOL/L (ref 20–29)
CREAT SERPL-MCNC: 0.93 MG/DL (ref 0.57–1)
CRP SERPL-MCNC: 8 MG/L (ref 0–10)
EGFRCR SERPLBLD CKD-EPI 2021: 67 ML/MIN/1.73
EOSINOPHIL # BLD AUTO: 0.2 X10E3/UL (ref 0–0.4)
EOSINOPHIL NFR BLD AUTO: 3 %
ERYTHROCYTE [DISTWIDTH] IN BLOOD BY AUTOMATED COUNT: 12.8 % (ref 11.7–15.4)
ERYTHROCYTE [SEDIMENTATION RATE] IN BLOOD BY WESTERGREN METHOD: 18 MM/HR (ref 0–40)
GLOBULIN SER CALC-MCNC: 2.6 G/DL (ref 1.5–4.5)
GLUCOSE SERPL-MCNC: 102 MG/DL (ref 65–99)
HCT VFR BLD AUTO: 38.4 % (ref 34–46.6)
HDLC SERPL-MCNC: 48 MG/DL
HGB BLD-MCNC: 12.8 G/DL (ref 11.1–15.9)
IMM GRANULOCYTES # BLD AUTO: 0 X10E3/UL (ref 0–0.1)
IMM GRANULOCYTES NFR BLD AUTO: 0 %
LDLC SERPL CALC-MCNC: 181 MG/DL (ref 0–99)
LDLC/HDLC SERPL: 3.8 RATIO (ref 0–3.2)
LIPASE SERPL-CCNC: 39 U/L (ref 14–72)
LYMPHOCYTES # BLD AUTO: 1.6 X10E3/UL (ref 0.7–3.1)
LYMPHOCYTES NFR BLD AUTO: 25 %
MCH RBC QN AUTO: 30.8 PG (ref 26.6–33)
MCHC RBC AUTO-ENTMCNC: 33.3 G/DL (ref 31.5–35.7)
MCV RBC AUTO: 93 FL (ref 79–97)
MONOCYTES # BLD AUTO: 0.5 X10E3/UL (ref 0.1–0.9)
MONOCYTES NFR BLD AUTO: 7 %
NEUTROPHILS # BLD AUTO: 4 X10E3/UL (ref 1.4–7)
NEUTROPHILS NFR BLD AUTO: 64 %
PLATELET # BLD AUTO: 333 X10E3/UL (ref 150–450)
POTASSIUM SERPL-SCNC: 4.7 MMOL/L (ref 3.5–5.2)
PROT SERPL-MCNC: 7.2 G/DL (ref 6–8.5)
RBC # BLD AUTO: 4.15 X10E6/UL (ref 3.77–5.28)
RHEUMATOID FACT SERPL-ACNC: <10 IU/ML
SODIUM SERPL-SCNC: 142 MMOL/L (ref 134–144)
TRIGL SERPL-MCNC: 194 MG/DL (ref 0–149)
TSH SERPL DL<=0.005 MIU/L-ACNC: 1.45 UIU/ML (ref 0.45–4.5)
URATE SERPL-MCNC: 7 MG/DL (ref 3–7.2)
VLDLC SERPL CALC-MCNC: 36 MG/DL (ref 5–40)
WBC # BLD AUTO: 6.4 X10E3/UL (ref 3.4–10.8)

## 2022-07-14 RX ORDER — LORATADINE 10 MG/1
1 CAPSULE, LIQUID FILLED ORAL DAILY
COMMUNITY

## 2022-07-14 NOTE — PROGRESS NOTES
• A My-Chart message has been sent to the patient for PATIENT ROUNDING with [facility]

## 2022-07-15 NOTE — ASSESSMENT & PLAN NOTE
Patient's (Body mass index is 43.53 kg/m².) indicates that they are morbidly obese (BMI > 40 or > 35 with obesity - related health condition) with health conditions that include hypertension, dyslipidemias and osteoarthritis . Weight is unchanged. BMI is is above average; BMI management plan is completed. We discussed portion control and increasing exercise.   Decrease snacking in evening.

## 2022-07-15 NOTE — ASSESSMENT & PLAN NOTE
Hypertension is stable.  Weight loss.  Regular aerobic exercise.  Continue current medications.  Ambulatory blood pressure monitoring.  Blood pressure will be reassessed in 4 weeks.  Continue Lisinopril daily.

## 2022-07-16 LAB
HBA1C MFR BLD: 6.2 % (ref 4.8–5.6)
Lab: NORMAL
WRITTEN AUTHORIZATION: NORMAL

## 2022-07-18 DIAGNOSIS — F41.9 ANXIETY: ICD-10-CM

## 2022-07-18 RX ORDER — BUPROPION HYDROCHLORIDE 150 MG/1
150 TABLET, EXTENDED RELEASE ORAL DAILY
Start: 2022-07-18 | End: 2022-08-17 | Stop reason: SDUPTHER

## 2022-08-12 ENCOUNTER — PRE-PROCEDURE SCREENING (OUTPATIENT)
Dept: GASTROENTEROLOGY | Facility: CLINIC | Age: 67
End: 2022-08-12

## 2022-08-15 ENCOUNTER — TELEPHONE (OUTPATIENT)
Dept: GASTROENTEROLOGY | Facility: CLINIC | Age: 67
End: 2022-08-15

## 2022-08-15 NOTE — TELEPHONE ENCOUNTER
Caller: Risa Jimenez    Relationship: Self    Best call back number: [phone]    What is the best time to reach you: ANYTIME     Who are you requesting to speak with (clinical staff, provider,  specific staff member): SCHEDULING     Do you know the name of the person who called: CARLOS    What was the call regarding: COLONOSCOPY QUESTIONNAIRE     Do you require a callback: YES

## 2022-08-17 ENCOUNTER — OFFICE VISIT (OUTPATIENT)
Dept: FAMILY MEDICINE CLINIC | Facility: CLINIC | Age: 67
End: 2022-08-17

## 2022-08-17 VITALS
SYSTOLIC BLOOD PRESSURE: 126 MMHG | TEMPERATURE: 98 F | DIASTOLIC BLOOD PRESSURE: 80 MMHG | BODY MASS INDEX: 40.84 KG/M2 | HEART RATE: 85 BPM | WEIGHT: 208 LBS | OXYGEN SATURATION: 99 % | HEIGHT: 60 IN

## 2022-08-17 DIAGNOSIS — M25.50 ARTHRALGIA OF MULTIPLE JOINTS: ICD-10-CM

## 2022-08-17 DIAGNOSIS — F41.9 ANXIETY: ICD-10-CM

## 2022-08-17 DIAGNOSIS — E78.2 MIXED HYPERLIPIDEMIA: ICD-10-CM

## 2022-08-17 DIAGNOSIS — R10.13 ABDOMINAL DISCOMFORT, EPIGASTRIC: Primary | ICD-10-CM

## 2022-08-17 PROCEDURE — 99213 OFFICE O/P EST LOW 20 MIN: CPT | Performed by: NURSE PRACTITIONER

## 2022-08-17 RX ORDER — CELECOXIB 200 MG/1
200 CAPSULE ORAL DAILY
Qty: 90 CAPSULE | Refills: 1 | Status: SHIPPED | OUTPATIENT
Start: 2022-08-17 | End: 2023-01-18

## 2022-08-17 RX ORDER — BUPROPION HYDROCHLORIDE 150 MG/1
150 TABLET, EXTENDED RELEASE ORAL DAILY
Qty: 90 TABLET | Refills: 1 | Status: SHIPPED | OUTPATIENT
Start: 2022-08-17 | End: 2023-01-26

## 2022-08-17 NOTE — PATIENT INSTRUCTIONS
Come in for repeat labs in 2 months.  Continue to work on healthy diet and exercise.  Follow up pending lab results.  Follow up in 6 months, or sooner if problems or concerns.

## 2022-08-17 NOTE — PROGRESS NOTES
"Answers for HPI/ROS submitted by the patient on 8/16/2022  Please describe your symptoms.: Follow up on medication changes.  Have you had these symptoms before?: No  How long have you been having these symptoms?: 1-4 days  Please list any medications you are currently taking for this condition.: NA  Please describe any probable cause for these symptoms. : N/A  What is the primary reason for your visit?: Other    Subjective   Risa Jimenez is a 67 y.o. female.     Chief Complaint   Patient presents with   • Anxiety     F/U on medications changes       History of Present Illness   Patient presents for follow up anxiety: takes Sertraline daily and added Wellbutrin daily and has helped; would like to try to get off Sertraline; concerned Sertraline has caused weight gain; pt decreased Sertraline to 50 mg daily after starting Wellbutrin once daily; no more \"ear snaps\" with lower dose of Sertraline, had trouble with ear snaps when tried to come off Sertraline in past; has been more motivated to do things; no more irritable than was; no problems with sleep; has always lived by herself, but recently has has been living with sister, has been hard adjusting; Wellbutrin gave her energy to revamp her room and if wants to be by herself will go in her room now that she likes her room; no SI/HI.    F/U arthralgias: started Celebrex daily instead of Meloxicam and has been working well; no more abdominal pain or GI symptoms; has not needed to take Tylenol recently; needs refill.     Working to get colonoscopy scheduled.      The following portions of the patient's history were reviewed and updated as appropriate: allergies, current medications, past family history, past medical history, past social history, past surgical history and problem list.      Current Outpatient Medications   Medication Sig Dispense Refill   • buPROPion SR (Wellbutrin SR) 150 MG 12 hr tablet Take 1 tablet by mouth Daily. 90 tablet 1   • celecoxib (CeleBREX) 200 " MG capsule Take 1 capsule by mouth Daily. 90 capsule 1   • Cholecalciferol (VITAMIN D-3 PO) Take  by mouth.     • fluticasone (FLONASE) 50 MCG/ACT nasal spray 2 sprays into each nostril Daily.     • lisinopril (PRINIVIL,ZESTRIL) 10 MG tablet Take 1 tablet by mouth Every Night. 90 tablet 2   • Loratadine 10 MG capsule Take 1 capsule by mouth Daily.     • Multiple Vitamins-Minerals (MULTIVITAMIN WITH MINERALS) tablet tablet Take 1 tablet by mouth Daily.     • sertraline (Zoloft) 50 MG tablet Take 1 tablet by mouth Daily. 90 tablet 1     No current facility-administered medications for this visit.       Past Medical History:   Diagnosis Date   • Abdominal discomfort, epigastric 7/14/2022   • Allergic rhinitis    • Anxiety    • Arthritis    • Chronic pain    • Chronic pain of left knee 5/2/2017   • High cholesterol    • History of total knee arthroplasty, left 5/2/2018   • Hypertension    • Left knee pain    • Left leg pain    • Prediabetes    • S/P total knee arthroplasty, left 11/28/2017   • Sciatic leg pain     LEFT   • Status post total left knee replacement 1/3/2018       Past Surgical History:   Procedure Laterality Date   • BACK SURGERY  2012    Micro-Discectomy L5-S1 - Boom Smith MD   • BREAST BIOPSY Right 2000    benign--fibrocystic   • CHOLECYSTECTOMY  2002   • COLONOSCOPY  2009   • JOINT REPLACEMENT Right 2015   • LASIK  1999   • SINUS SURGERY  2005    and 2010   • TOTAL KNEE ARTHROPLASTY Left 11/15/2017    Procedure: LEFT TOTAL KNEE ARTHROPLASTY WITH SARAH NAVIGATION; Jose Muñoz MD;  [facility] MAIN OR   • TOTAL KNEE ARTHROPLASTY Right 2015       Family History   Problem Relation Age of Onset   • Hypertension Mother    • Arthritis Mother         Rheumatoid dx at 82   • Rheum arthritis Mother    • Hypertension Father    • Hypertension Sister    • Arthritis Sister         Dx 63 2022   • Rheum arthritis Sister    • Malig Hyperthermia Neg Hx        Social History     Socioeconomic History   • Marital  "status: Single   Tobacco Use   • Smoking status: Never Smoker   • Smokeless tobacco: Never Used   Substance and Sexual Activity   • Alcohol use: Yes     Alcohol/week: 1.0 standard drink     Types: 1 Glasses of wine per week     Comment: Rare   • Drug use: No   • Sexual activity: Never       Review of Systems   Constitutional: Negative for appetite change, fatigue, unexpected weight gain and unexpected weight loss.   Respiratory: Negative for cough, chest tightness and shortness of breath.    Cardiovascular: Negative for chest pain, palpitations and leg swelling.   Gastrointestinal: Negative for abdominal pain, blood in stool, GERD and indigestion.   Endocrine: Polydipsia: some, mild.   Skin: Negative for rash.   Neurological: Negative for dizziness, syncope, light-headedness and headache.     YARED-7 anxiety score: 4    Objective   Vitals:    08/17/22 1543   BP: 126/80   Pulse: 85   Temp: 98 °F (36.7 °C)   TempSrc: Infrared   SpO2: 99%   Weight: 94.3 kg (208 lb)   Height: 152.4 cm (60\")     Body mass index is 40.62 kg/m².    Physical Exam  Vitals and nursing note reviewed.   Constitutional:       General: She is not in acute distress.     Appearance: She is well-developed and well-groomed. She is not diaphoretic.   HENT:      Head: Normocephalic.      Right Ear: External ear normal.      Left Ear: External ear normal.   Eyes:      Conjunctiva/sclera: Conjunctivae normal.   Neck:      Vascular: No carotid bruit.   Cardiovascular:      Rate and Rhythm: Normal rate and regular rhythm.      Pulses: Normal pulses.      Heart sounds: Normal heart sounds. No murmur heard.  Pulmonary:      Effort: Pulmonary effort is normal. No respiratory distress.      Breath sounds: Normal breath sounds.   Abdominal:      General: Bowel sounds are normal.      Palpations: Abdomen is soft. There is no hepatomegaly or splenomegaly.      Tenderness: There is no abdominal tenderness. There is no guarding.   Musculoskeletal:      Cervical back: " Normal range of motion and neck supple.      Right lower leg: No edema.      Left lower leg: No edema.   Skin:     General: Skin is warm and dry.      Findings: No rash.   Neurological:      Mental Status: She is alert and oriented to person, place, and time.      Gait: Gait is intact.   Psychiatric:         Mood and Affect: Mood normal.         Behavior: Behavior normal.         Thought Content: Thought content normal.         Cognition and Memory: Cognition normal.         Judgment: Judgment normal.         Lab Results   Component Value Date    WBC 6.4 07/13/2022    RBC 4.15 07/13/2022    HGB 12.8 07/13/2022    HCT 38.4 07/13/2022    MCV 93 07/13/2022    MCH 30.8 07/13/2022    MCHC 33.3 07/13/2022    RDW 12.8 07/13/2022    RDWSD 44.6 11/07/2017    MPV 9.5 11/07/2017     07/13/2022    NEUTRORELPCT 64 07/13/2022    LYMPHORELPCT 25 07/13/2022    MONORELPCT 7 07/13/2022    EOSRELPCT 3 07/13/2022    BASORELPCT 1 07/13/2022    NEUTROABS 4.0 07/13/2022    LYMPHSABS 1.6 07/13/2022    MONOSABS 0.5 07/13/2022    EOSABS 0.2 07/13/2022    BASOSABS 0.1 07/13/2022     Lab Results   Component Value Date    GLUCOSE 102 (H) 07/13/2022    BUN 22 07/13/2022    CREATININE 0.93 07/13/2022    EGFRIFNONA 83 11/16/2017    BCR 24 07/13/2022    K 4.7 07/13/2022    CO2 23 07/13/2022    CALCIUM 9.6 07/13/2022    PROTENTOTREF 7.2 07/13/2022    ALBUMIN 4.6 07/13/2022    LABIL2 1.8 07/13/2022    AST 19 07/13/2022    ALT 22 07/13/2022      Lab Results   Component Value Date    CHLPL 265 (H) 07/13/2022    TRIG 194 (H) 07/13/2022    HDL 48 07/13/2022    VLDL 36 07/13/2022     (H) 07/13/2022     Lab Results   Component Value Date    TSH 1.450 07/13/2022     Lab Results   Component Value Date    HGBA1C 6.2 (H) 07/13/2022     Lab Results   Component Value Date    LABPH 7.5 05/12/2015    COLORU Yellow 11/07/2017    CLARITYU Clear 11/07/2017    LEUKOCYTESUR Negative 11/07/2017    GLUCOSEU Negative 11/07/2017    BLOODU Negative 11/07/2017     BILIRUBINUR Negative 11/07/2017    NITRITEU Negative 11/07/2017          Assessment    Problem List Items Addressed This Visit     Mixed hyperlipidemia    Current Assessment & Plan     Continue to work on healthy diet and exercise.         Anxiety    Current Assessment & Plan     Improving. Continue Sertraline and Wellbutrin daily.         Relevant Medications    buPROPion SR (Wellbutrin SR) 150 MG 12 hr tablet    sertraline (Zoloft) 50 MG tablet    Arthralgia of multiple joints    Current Assessment & Plan     Improved.  Continue Celebrex daily.         Relevant Medications    celecoxib (CeleBREX) 200 MG capsule    Other Relevant Orders    CBC & Differential    Comprehensive Metabolic Panel    RESOLVED: Abdominal discomfort, epigastric - Primary        Come in for repeat labs in 2 months.  Return in about 6 months (around 2/17/2023) for Recheck; lab only in 2 months.or sooner if symptoms persist or worsen.         COVID-19 Precautions - Patient was compliant in wearing a mask. When I saw the patient, I used appropriate personal protective equipment (PPE) including mask, gloves, and eye shield (standard procedure).  Hand hygiene was completed before and after seeing the patient.

## 2022-08-18 PROBLEM — R10.13 ABDOMINAL DISCOMFORT, EPIGASTRIC: Status: RESOLVED | Noted: 2022-07-14 | Resolved: 2022-08-18

## 2022-10-04 ENCOUNTER — PREP FOR SURGERY (OUTPATIENT)
Dept: OTHER | Facility: HOSPITAL | Age: 67
End: 2022-10-04

## 2022-10-04 DIAGNOSIS — Z12.11 SCREEN FOR COLON CANCER: Primary | ICD-10-CM

## 2022-10-10 DIAGNOSIS — M25.50 ARTHRALGIA OF MULTIPLE JOINTS: ICD-10-CM

## 2022-11-08 ENCOUNTER — TELEPHONE (OUTPATIENT)
Dept: FAMILY MEDICINE CLINIC | Facility: CLINIC | Age: 67
End: 2022-11-08

## 2022-11-08 NOTE — TELEPHONE ENCOUNTER
Caller: Risa Jimenez    Relationship to patient: Self    Best call back number: [phone]    Type of visit: LAB    Requested date: ANY    If rescheduling, when is the original appointment: 10/10/22    Additional notes:HUB TRIED TO WARM TRANSFER BUT WAS UNSUCCESSFUL. PLEASE CALL AND SCHEDULE.

## 2022-11-09 ENCOUNTER — TELEPHONE (OUTPATIENT)
Dept: GASTROENTEROLOGY | Facility: CLINIC | Age: 67
End: 2022-11-09

## 2022-11-10 ENCOUNTER — TELEPHONE (OUTPATIENT)
Dept: GASTROENTEROLOGY | Facility: CLINIC | Age: 67
End: 2022-11-10

## 2022-11-10 LAB
ALBUMIN SERPL-MCNC: 4.2 G/DL (ref 3.8–4.8)
ALBUMIN/GLOB SERPL: 1.6 {RATIO} (ref 1.2–2.2)
ALP SERPL-CCNC: 87 IU/L (ref 44–121)
ALT SERPL-CCNC: 16 IU/L (ref 0–32)
AST SERPL-CCNC: 17 IU/L (ref 0–40)
BASOPHILS # BLD AUTO: 0.1 X10E3/UL (ref 0–0.2)
BASOPHILS NFR BLD AUTO: 1 %
BILIRUB SERPL-MCNC: <0.2 MG/DL (ref 0–1.2)
BUN SERPL-MCNC: 19 MG/DL (ref 8–27)
BUN/CREAT SERPL: 22 (ref 12–28)
CALCIUM SERPL-MCNC: 9.6 MG/DL (ref 8.7–10.3)
CHLORIDE SERPL-SCNC: 105 MMOL/L (ref 96–106)
CO2 SERPL-SCNC: 24 MMOL/L (ref 20–29)
CREAT SERPL-MCNC: 0.86 MG/DL (ref 0.57–1)
EGFRCR SERPLBLD CKD-EPI 2021: 73 ML/MIN/1.73
EOSINOPHIL # BLD AUTO: 0.3 X10E3/UL (ref 0–0.4)
EOSINOPHIL NFR BLD AUTO: 5 %
ERYTHROCYTE [DISTWIDTH] IN BLOOD BY AUTOMATED COUNT: 12.8 % (ref 11.7–15.4)
GLOBULIN SER CALC-MCNC: 2.6 G/DL (ref 1.5–4.5)
GLUCOSE SERPL-MCNC: 154 MG/DL (ref 70–99)
HCT VFR BLD AUTO: 37 % (ref 34–46.6)
HGB BLD-MCNC: 12.4 G/DL (ref 11.1–15.9)
IMM GRANULOCYTES # BLD AUTO: 0 X10E3/UL (ref 0–0.1)
IMM GRANULOCYTES NFR BLD AUTO: 0 %
LYMPHOCYTES # BLD AUTO: 1.4 X10E3/UL (ref 0.7–3.1)
LYMPHOCYTES NFR BLD AUTO: 24 %
MCH RBC QN AUTO: 30.8 PG (ref 26.6–33)
MCHC RBC AUTO-ENTMCNC: 33.5 G/DL (ref 31.5–35.7)
MCV RBC AUTO: 92 FL (ref 79–97)
MONOCYTES # BLD AUTO: 0.3 X10E3/UL (ref 0.1–0.9)
MONOCYTES NFR BLD AUTO: 6 %
NEUTROPHILS # BLD AUTO: 3.7 X10E3/UL (ref 1.4–7)
NEUTROPHILS NFR BLD AUTO: 64 %
PLATELET # BLD AUTO: 305 X10E3/UL (ref 150–450)
POTASSIUM SERPL-SCNC: 5 MMOL/L (ref 3.5–5.2)
PROT SERPL-MCNC: 6.8 G/DL (ref 6–8.5)
RBC # BLD AUTO: 4.02 X10E6/UL (ref 3.77–5.28)
SODIUM SERPL-SCNC: 141 MMOL/L (ref 134–144)
WBC # BLD AUTO: 5.7 X10E3/UL (ref 3.4–10.8)

## 2022-11-10 NOTE — TELEPHONE ENCOUNTER
Caller: Risa Jimenez    Relationship to patient: Self    Best call back number: [phone]      Type of visit: PATIENT CALLED IN ASKING TO CANCEL  COLONOSCOPY REQUEST.    Additional notes:PATIENT CALLED IN ASKING TO CANCEL  COLONOSCOPY REQUEST.

## 2022-11-29 DIAGNOSIS — F41.9 ANXIETY: ICD-10-CM

## 2022-11-29 RX ORDER — SERTRALINE HYDROCHLORIDE 100 MG/1
TABLET, FILM COATED ORAL
Qty: 90 TABLET | Refills: 0 | OUTPATIENT
Start: 2022-11-29

## 2023-01-18 DIAGNOSIS — M25.50 ARTHRALGIA OF MULTIPLE JOINTS: ICD-10-CM

## 2023-01-18 DIAGNOSIS — I10 PRIMARY HYPERTENSION: ICD-10-CM

## 2023-01-18 RX ORDER — CELECOXIB 200 MG/1
CAPSULE ORAL
Qty: 90 CAPSULE | Refills: 0 | Status: SHIPPED | OUTPATIENT
Start: 2023-01-18

## 2023-01-18 RX ORDER — LISINOPRIL 10 MG/1
TABLET ORAL
Qty: 90 TABLET | Refills: 0 | Status: SHIPPED | OUTPATIENT
Start: 2023-01-18

## 2023-01-18 NOTE — TELEPHONE ENCOUNTER
Rx Refill Note  Requested Prescriptions     Pending Prescriptions Disp Refills   • celecoxib (CeleBREX) 200 MG capsule [Pharmacy Med Name: celecoxib 200 mg capsule] 90 capsule 1     Sig: TAKE ONE CAPSULE BY MOUTH DAILY   • lisinopril (PRINIVIL,ZESTRIL) 10 MG tablet [Pharmacy Med Name: lisinopril 10 mg tablet] 90 tablet 2     Sig: TAKE ONE TABLET BY MOUTH EVERY NIGHT      Last office visit with prescribing clinician: 8/17/2022   Last telemedicine visit with prescribing clinician: 1/27/2023   Next office visit with prescribing clinician: 1/27/2023

## 2023-01-23 DIAGNOSIS — F41.9 ANXIETY: Primary | ICD-10-CM

## 2023-01-23 RX ORDER — SERTRALINE HYDROCHLORIDE 100 MG/1
100 TABLET, FILM COATED ORAL DAILY
Qty: 90 TABLET | Refills: 0 | Status: SHIPPED | OUTPATIENT
Start: 2023-01-23

## 2023-01-27 ENCOUNTER — OFFICE VISIT (OUTPATIENT)
Dept: FAMILY MEDICINE CLINIC | Facility: CLINIC | Age: 68
End: 2023-01-27
Payer: COMMERCIAL

## 2023-01-27 VITALS
HEART RATE: 78 BPM | OXYGEN SATURATION: 96 % | SYSTOLIC BLOOD PRESSURE: 126 MMHG | HEIGHT: 60 IN | TEMPERATURE: 98 F | DIASTOLIC BLOOD PRESSURE: 88 MMHG | WEIGHT: 208.6 LBS | BODY MASS INDEX: 40.95 KG/M2

## 2023-01-27 DIAGNOSIS — F41.9 ANXIETY: ICD-10-CM

## 2023-01-27 DIAGNOSIS — E78.2 MIXED HYPERLIPIDEMIA: ICD-10-CM

## 2023-01-27 DIAGNOSIS — I10 PRIMARY HYPERTENSION: Primary | ICD-10-CM

## 2023-01-27 DIAGNOSIS — M25.50 ARTHRALGIA OF MULTIPLE JOINTS: ICD-10-CM

## 2023-01-27 DIAGNOSIS — J30.9 ALLERGIC RHINITIS, UNSPECIFIED SEASONALITY, UNSPECIFIED TRIGGER: ICD-10-CM

## 2023-01-27 DIAGNOSIS — R73.03 PREDIABETES: ICD-10-CM

## 2023-01-27 PROCEDURE — 99213 OFFICE O/P EST LOW 20 MIN: CPT | Performed by: NURSE PRACTITIONER

## 2023-01-27 NOTE — PROGRESS NOTES
Subjective   Risa Jimenez is a 67 y.o. female.     Chief Complaint   Patient presents with   • Hypertension     6 mo follow up      • Anxiety       History of Present Illness   Patient presents for follow up HTN: takes Lisinopril daily; monitors BP on occasion, typically runs 120s/80s; no headaches; no orthostasis; no swelling; not much exercise; does work in the yard.    F/U prediabetes: has not been monitoring blood sugar recently; eats fresh fruits, vegetables, and lean meats; last A1c 6.2%.     F/U Hyperlipidemia: no medication; had aching with Atorvastatin in past; fasting today other than coffee with coffemate; has lost weight using [employer] in past; no longer considering weight loss program at [facility]; plans to do better with healthy diet and exercise.    F/U fatigue and aching: takes Celebrex daily and helps some; symptoms worse after activity; also takes Tylenol 2 tabs nightly and helps; back gets aggravated at times with activity; had surgery per Dr. Jean in past; had injured back in past when caring for father; has pain in lower back/SI region with prolonged walking or standing and then will go from bilateral lower back and down to buttocks; also has discomfort in multiple joints; sister and mother had RA; every joint hurts.     F/U anxiety: takes Sertraline daily and works well; happy with current dose; tried Wellbutrin and seemed to help at first, but then made irritable after being on med for period of time; so stopped Wellbutrin and went back to Sertraline 100 mg daily; feels better off Wellbutrin; some trouble sleeping due to bilateral shoulder pain and hard to get comfortable; has seen ortho in past; no SI/HI.     Sees ENT Dr. Brice; has had nasal surgery x2; does nasal irrigration regularly and helps; also uses Flonase and helps; takes Mucinex D on occasion and helps nasal stuffiness.      The following portions of the patient's history were reviewed and updated as  appropriate: allergies, current medications, past family history, past medical history, past social history, past surgical history and problem list.    Current Outpatient Medications on File Prior to Visit   Medication Sig   • celecoxib (CeleBREX) 200 MG capsule TAKE ONE CAPSULE BY MOUTH DAILY   • Cholecalciferol (VITAMIN D-3 PO) Take  by mouth.   • fluticasone (FLONASE) 50 MCG/ACT nasal spray 2 sprays into each nostril Daily.   • glucosamine-chondroitin 500-400 MG capsule capsule Take  by mouth.   • lisinopril (PRINIVIL,ZESTRIL) 10 MG tablet TAKE ONE TABLET BY MOUTH EVERY NIGHT   • Loratadine 10 MG capsule Take 1 capsule by mouth Daily.   • MAGNESIUM PO Take  by mouth.   • sertraline (Zoloft) 100 MG tablet Take 1 tablet by mouth Daily.   • vitamin C (ASCORBIC ACID) 250 MG tablet Take 250 mg by mouth Daily.   • VITAMIN E PO Take 1 tablet by mouth Daily.   • [DISCONTINUED] Multiple Vitamins-Minerals (MULTIVITAMIN WITH MINERALS) tablet tablet Take 1 tablet by mouth Daily.     No current facility-administered medications on file prior to visit.        Past Medical History:   Diagnosis Date   • Abdominal discomfort, epigastric 7/14/2022   • Allergic rhinitis    • Anxiety    • Arthritis    • Chronic pain    • Chronic pain of left knee 5/2/2017   • High cholesterol    • History of total knee arthroplasty, left 5/2/2018   • Hypertension    • Left knee pain    • Left leg pain    • Prediabetes    • S/P total knee arthroplasty, left 11/28/2017   • Sciatic leg pain     LEFT   • Status post total left knee replacement 1/3/2018       Past Surgical History:   Procedure Laterality Date   • BACK SURGERY  2012    Micro-Discectomy L5-S1 - Boom Smith MD   • BREAST BIOPSY Right 2000    benign--fibrocystic   • CHOLECYSTECTOMY  2002   • COLONOSCOPY  2009   • JOINT REPLACEMENT Right 2015   • LASIK  1999   • SINUS SURGERY  2005    and 2010   • TOTAL KNEE ARTHROPLASTY Left 11/15/2017    Procedure: LEFT TOTAL KNEE ARTHROPLASTY WITH  "SARAH NAVIGATION; Jose Muñoz MD;   [facility]   • TOTAL KNEE ARTHROPLASTY Right 2015       Family History   Problem Relation Age of Onset   • Hypertension Mother    • Arthritis Mother         Rheumatoid dx at 82   • Rheum arthritis Mother    • Hypertension Father    • Hypertension Sister    • Arthritis Sister         Dx 63 2022   • Rheum arthritis Sister    • Malig Hyperthermia Neg Hx        Social History     Socioeconomic History   • Marital status: Single   Tobacco Use   • Smoking status: Never   • Smokeless tobacco: Never   Substance and Sexual Activity   • Alcohol use: Yes     Alcohol/week: 1.0 standard drink     Types: 1 Glasses of wine per week     Comment: Rare   • Drug use: No   • Sexual activity: Never       Review of Systems   Constitutional: Positive for fatigue. Negative for appetite change, unexpected weight gain and unexpected weight loss.   HENT: Negative for ear pain (pressure at times) and sore throat.    Eyes: Negative for blurred vision.   Respiratory: Negative for cough, chest tightness and shortness of breath.    Cardiovascular: Negative for chest pain and palpitations.   Gastrointestinal: Negative for abdominal pain, blood in stool, constipation, diarrhea, GERD and indigestion.   Endocrine: Negative for cold intolerance and polydipsia.   Genitourinary: Negative for dysuria and frequency.   Musculoskeletal: Arthralgias: see HPI.   Skin: Negative for rash.   Neurological: Negative for syncope and weakness.   Hematological: Does not bruise/bleed easily.   Psychiatric/Behavioral: Negative for suicidal ideas.       Objective   Vitals:    01/27/23 1308   BP: 126/88   BP Location: Left arm   Patient Position: Sitting   Cuff Size: Adult   Pulse: 78   Temp: 98 °F (36.7 °C)   SpO2: 96%   Weight: 94.6 kg (208 lb 9.6 oz)   Height: 152.4 cm (60\")     Body mass index is 40.74 kg/m².    Physical Exam  Vitals and nursing note reviewed.   Constitutional:       General: She is not in acute distress.   "   Appearance: She is well-developed and well-groomed. She is not diaphoretic.   HENT:      Head: Normocephalic.      Right Ear: Tympanic membrane and external ear normal. No decreased hearing noted.      Left Ear: Tympanic membrane and external ear normal. No decreased hearing noted.      Nose: Nose normal.      Right Sinus: No maxillary sinus tenderness or frontal sinus tenderness.      Left Sinus: No maxillary sinus tenderness or frontal sinus tenderness.      Mouth/Throat:      Mouth: Mucous membranes are moist.      Pharynx: No posterior oropharyngeal erythema. Oropharyngeal exudate: mild drainage in posterior pharynx.   Eyes:      Conjunctiva/sclera: Conjunctivae normal.   Neck:      Vascular: No carotid bruit.   Cardiovascular:      Rate and Rhythm: Normal rate and regular rhythm.      Pulses: Normal pulses.      Heart sounds: Normal heart sounds. No murmur heard.  Pulmonary:      Effort: Pulmonary effort is normal. No respiratory distress.      Breath sounds: Normal breath sounds.   Abdominal:      General: Bowel sounds are normal.      Palpations: Abdomen is soft. There is no hepatomegaly or splenomegaly.      Tenderness: There is no abdominal tenderness. There is no guarding.   Musculoskeletal:      Cervical back: Normal range of motion and neck supple.      Right lower leg: No edema.      Left lower leg: No edema.   Lymphadenopathy:      Cervical: No cervical adenopathy.   Skin:     General: Skin is warm and dry.      Findings: No rash.   Neurological:      Mental Status: She is alert and oriented to person, place, and time.      Gait: Gait is intact.   Psychiatric:         Mood and Affect: Mood normal.         Behavior: Behavior normal.         Thought Content: Thought content normal.         Cognition and Memory: Cognition normal.         Judgment: Judgment normal.         Lab Results   Component Value Date    WBC 5.7 11/09/2022    RBC 4.02 11/09/2022    HGB 12.4 11/09/2022    HCT 37.0 11/09/2022    MCV  92 11/09/2022    MCH 30.8 11/09/2022    MCHC 33.5 11/09/2022    RDW 12.8 11/09/2022    RDWSD 44.6 11/07/2017    MPV 9.5 11/07/2017     11/09/2022    NEUTRORELPCT 64 11/09/2022    LYMPHORELPCT 24 11/09/2022    MONORELPCT 6 11/09/2022    EOSRELPCT 5 11/09/2022    BASORELPCT 1 11/09/2022    NEUTROABS 3.7 11/09/2022    LYMPHSABS 1.4 11/09/2022    MONOSABS 0.3 11/09/2022    EOSABS 0.3 11/09/2022    BASOSABS 0.1 11/09/2022     Lab Results   Component Value Date    GLUCOSE 154 (H) 11/09/2022    BUN 19 11/09/2022    CREATININE 0.86 11/09/2022    EGFRIFNONA 83 11/16/2017    BCR 22 11/09/2022    K 5.0 11/09/2022    CO2 24 11/09/2022    CALCIUM 9.6 11/09/2022    PROTENTOTREF 6.8 11/09/2022    ALBUMIN 4.2 11/09/2022    LABIL2 1.6 11/09/2022    AST 17 11/09/2022    ALT 16 11/09/2022      Lab Results   Component Value Date    CHLPL 265 (H) 07/13/2022    TRIG 194 (H) 07/13/2022    HDL 48 07/13/2022    VLDL 36 07/13/2022     (H) 07/13/2022     Lab Results   Component Value Date    TSH 1.450 07/13/2022     Lab Results   Component Value Date    HGBA1C 6.2 (H) 07/13/2022           Assessment    Problem List Items Addressed This Visit     Hypertension - Primary    Current Assessment & Plan     Hypertension is stable.  Weight loss.  Regular aerobic exercise.  Continue current medications.  Ambulatory blood pressure monitoring.  Blood pressure will be reassessed at the next regular appointment.  Continue Lisinopril daily.         Relevant Orders    Comprehensive Metabolic Panel    Lipid Panel With LDL / HDL Ratio    Mixed hyperlipidemia    Current Assessment & Plan     Continue to work on healthy diet and exercise.         Relevant Orders    Comprehensive Metabolic Panel    Lipid Panel With LDL / HDL Ratio    Prediabetes    Current Assessment & Plan     Continue to watch carbs/sugars in diet.  Continue to work on exercise.         Relevant Orders    Hemoglobin A1c    Anxiety    Current Assessment & Plan     Stable.   Continue Sertraline daily.         Arthralgia of multiple joints    Current Assessment & Plan     Continue Celebrex daily.  Continue Tylenol as needed.         Allergic rhinitis    Current Assessment & Plan     Continue Flonase daily.  Continue nasal irrigation as needed.               Return in about 6 months (around 7/27/2023) for Annual physical, Recheck.or sooner if problems or concerns.         COVID-19 Precautions - Patient was compliant in wearing a mask. When I saw the patient, I used appropriate personal protective equipment (PPE) including mask, gloves, and eye shield (standard procedure).  Hand hygiene was completed before and after seeing the patient.

## 2023-01-27 NOTE — PATIENT INSTRUCTIONS
Continue to work on healthy diet and exercise.  Follow up pending lab results.  Follow up in 6 months for physical with fasting labs, or sooner if problems or concerns.

## 2023-01-28 LAB
ALBUMIN SERPL-MCNC: 4.5 G/DL (ref 3.8–4.8)
ALBUMIN/GLOB SERPL: 1.9 {RATIO} (ref 1.2–2.2)
ALP SERPL-CCNC: 82 IU/L (ref 44–121)
ALT SERPL-CCNC: 14 IU/L (ref 0–32)
AST SERPL-CCNC: 18 IU/L (ref 0–40)
BILIRUB SERPL-MCNC: 0.3 MG/DL (ref 0–1.2)
BUN SERPL-MCNC: 18 MG/DL (ref 8–27)
BUN/CREAT SERPL: 23 (ref 12–28)
CALCIUM SERPL-MCNC: 9.7 MG/DL (ref 8.7–10.3)
CHLORIDE SERPL-SCNC: 104 MMOL/L (ref 96–106)
CHOLEST SERPL-MCNC: 268 MG/DL (ref 100–199)
CO2 SERPL-SCNC: 24 MMOL/L (ref 20–29)
CREAT SERPL-MCNC: 0.79 MG/DL (ref 0.57–1)
EGFRCR SERPLBLD CKD-EPI 2021: 81 ML/MIN/1.73
GLOBULIN SER CALC-MCNC: 2.4 G/DL (ref 1.5–4.5)
GLUCOSE SERPL-MCNC: 100 MG/DL (ref 70–99)
HBA1C MFR BLD: 5.9 % (ref 4.8–5.6)
HDLC SERPL-MCNC: 43 MG/DL
LDLC SERPL CALC-MCNC: 184 MG/DL (ref 0–99)
LDLC/HDLC SERPL: 4.3 RATIO (ref 0–3.2)
POTASSIUM SERPL-SCNC: 4.9 MMOL/L (ref 3.5–5.2)
PROT SERPL-MCNC: 6.9 G/DL (ref 6–8.5)
SODIUM SERPL-SCNC: 139 MMOL/L (ref 134–144)
TRIGL SERPL-MCNC: 217 MG/DL (ref 0–149)
VLDLC SERPL CALC-MCNC: 41 MG/DL (ref 5–40)

## 2023-01-28 RX ORDER — MULTIVIT WITH MINERALS/LUTEIN
250 TABLET ORAL DAILY
COMMUNITY

## 2023-01-28 RX ORDER — SODIUM PHOSPHATE,MONO-DIBASIC 19G-7G/118
ENEMA (ML) RECTAL
COMMUNITY

## 2023-01-28 NOTE — ASSESSMENT & PLAN NOTE
Hypertension is stable.  Weight loss.  Regular aerobic exercise.  Continue current medications.  Ambulatory blood pressure monitoring.  Blood pressure will be reassessed at the next regular appointment.  Continue Lisinopril daily.

## 2023-04-12 DIAGNOSIS — F41.9 ANXIETY: ICD-10-CM

## 2023-04-12 RX ORDER — SERTRALINE HYDROCHLORIDE 100 MG/1
TABLET, FILM COATED ORAL
Qty: 90 TABLET | Refills: 0 | Status: SHIPPED | OUTPATIENT
Start: 2023-04-12

## 2023-04-12 NOTE — TELEPHONE ENCOUNTER
LOV             1/27/2023   NOV             7/28/2023   Last RF        1/23/23  90 day    Josephine Munoz, DASIAA/LMR

## 2023-04-28 DIAGNOSIS — M25.50 ARTHRALGIA OF MULTIPLE JOINTS: ICD-10-CM

## 2023-04-28 DIAGNOSIS — F41.9 ANXIETY: ICD-10-CM

## 2023-04-28 RX ORDER — SERTRALINE HYDROCHLORIDE 100 MG/1
TABLET, FILM COATED ORAL
Qty: 90 TABLET | Refills: 0 | Status: SHIPPED | OUTPATIENT
Start: 2023-04-28

## 2023-04-28 RX ORDER — CELECOXIB 200 MG/1
CAPSULE ORAL
Qty: 90 CAPSULE | Refills: 0 | Status: SHIPPED | OUTPATIENT
Start: 2023-04-28

## 2023-04-28 NOTE — TELEPHONE ENCOUNTER
Rx Refill Note  Requested Prescriptions     Pending Prescriptions Disp Refills   • celecoxib (CeleBREX) 200 MG capsule [Pharmacy Med Name: celecoxib 200 mg capsule] 90 capsule 0     Sig: TAKE ONE CAPSULE BY MOUTH DAILY   • sertraline (ZOLOFT) 100 MG tablet [Pharmacy Med Name: sertraline 100 mg tablet] 90 tablet 0     Sig: TAKE ONE TABLET BY MOUTH DAILY      Last office visit with prescribing clinician: 1/27/2023   Last telemedicine visit with prescribing clinician: 7/28/2023   Next office visit with prescribing clinician: 7/28/2023                         Would you like a call back once the refill request has been completed: [] Yes [] No    If the office needs to give you a call back, can they leave a voicemail: [] Yes [] No    Zohra Rasheed MA  04/28/23, 12:07 EDT

## 2023-05-11 NOTE — ASSESSMENT & PLAN NOTE
Continue Celebrex daily.  Continue Tylenol as needed.   WM   likely pre renal sec to GI loss (ileostomy)   High SG and hyaline cast on UA suggestive of dehydration  Urine studies suggestive of pre renal state  Renal function improving at present s/p IVFs   Start NS with 10mEq KCL @ 75 cc/ hr x 24 hrs  CT A/P 5/8 --> No hydronephrosis  Monitor BMP, u/o  Monitor Renal function closely    Hyponatremia  likely hypovolemic sec to GI loss ( Ileostomy)  Urine lytes suggestive of Dehydration   improving   Continue IVFs as above   Monitor  Avoid overcorrection > 6-8 mEq    Hypokalemia  sec to GI loss  s/p KCL PO 40 mEq q4hrs apart x 2 doses  Continue IVFs as above   Repeat K and replete as needed  Monitor closely    Acidosis   non Anion gap  sec to GI loss   improving   Continue to monitor     Hematuria   Repeat UA -- > wnl   CT a/p 5/8 with no renal mass     Discussed with primary team.

## 2023-07-28 ENCOUNTER — OFFICE VISIT (OUTPATIENT)
Dept: FAMILY MEDICINE CLINIC | Facility: CLINIC | Age: 68
End: 2023-07-28
Payer: COMMERCIAL

## 2023-07-28 VITALS
WEIGHT: 203 LBS | BODY MASS INDEX: 39.85 KG/M2 | TEMPERATURE: 98.2 F | DIASTOLIC BLOOD PRESSURE: 74 MMHG | OXYGEN SATURATION: 97 % | SYSTOLIC BLOOD PRESSURE: 124 MMHG | HEART RATE: 70 BPM | HEIGHT: 60 IN

## 2023-07-28 DIAGNOSIS — G89.29 CHRONIC BILATERAL LOW BACK PAIN WITH LEFT-SIDED SCIATICA: ICD-10-CM

## 2023-07-28 DIAGNOSIS — E78.2 MIXED HYPERLIPIDEMIA: ICD-10-CM

## 2023-07-28 DIAGNOSIS — R73.03 PREDIABETES: ICD-10-CM

## 2023-07-28 DIAGNOSIS — I10 PRIMARY HYPERTENSION: ICD-10-CM

## 2023-07-28 DIAGNOSIS — Z00.00 ENCOUNTER FOR ANNUAL PHYSICAL EXAM: Primary | ICD-10-CM

## 2023-07-28 DIAGNOSIS — M54.42 CHRONIC BILATERAL LOW BACK PAIN WITH LEFT-SIDED SCIATICA: ICD-10-CM

## 2023-07-28 DIAGNOSIS — F41.9 ANXIETY: ICD-10-CM

## 2023-07-28 DIAGNOSIS — E66.01 CLASS 3 SEVERE OBESITY WITH SERIOUS COMORBIDITY AND BODY MASS INDEX (BMI) OF 40.0 TO 44.9 IN ADULT, UNSPECIFIED OBESITY TYPE: ICD-10-CM

## 2023-07-28 DIAGNOSIS — M25.50 ARTHRALGIA OF MULTIPLE JOINTS: ICD-10-CM

## 2023-07-28 RX ORDER — SERTRALINE HYDROCHLORIDE 100 MG/1
100 TABLET, FILM COATED ORAL DAILY
Qty: 90 TABLET | Refills: 1 | Status: SHIPPED | OUTPATIENT
Start: 2023-07-28

## 2023-07-28 RX ORDER — LISINOPRIL 10 MG/1
10 TABLET ORAL EVERY EVENING
Qty: 90 TABLET | Refills: 1 | Status: SHIPPED | OUTPATIENT
Start: 2023-07-28

## 2023-07-28 RX ORDER — CELECOXIB 200 MG/1
200 CAPSULE ORAL DAILY
Qty: 90 CAPSULE | Refills: 1 | Status: SHIPPED | OUTPATIENT
Start: 2023-07-28

## 2023-07-28 NOTE — PROGRESS NOTES
Subjective   Risa Jimenez is a 68 y.o. female.     Chief Complaint   Patient presents with    Annual Exam     Blood work          History of Present Illness   Patient presents for CPE with fasting labs; no new problems or concerns today; healthy diet; no formal exercise, works in yard and garden, plans to try to start walking; regular dental visits; last eye exam about 6-8 months ago, has had some trouble with near vision and plans to schedule eye exam, has floaters; no problems hearing; immunizations: does not want anymore immunizations; no recent female care; declines mammo, last mammo 2017, declines mammo; no family history of breast cancer; colonoscopy in 2009, declines repeat; no family history of colon cancer.     F/U HTN: takes Lisinopril daily; monitors BP on occasion, typically runs 120s/70s; no headaches; no orthostasis; no swelling.    F/U prediabetes: one night did not feel well and checked blood sugar and was 167, checked for next several days and was 80-110s; watches sugars in diet; eats fresh fruits, lots of vegetables, and lean meats; last A1c 5.9%; has lost about 5 pounds since LOV.     F/U Hyperlipidemia: no medication; had aching with Atorvastatin in past; declines medication for cholesterol     F/U fatigue and aching: takes Celebrex daily and Tylenol nightly as needed and helps;   symptoms worse after activity;    back gets aggravated at times with activity; had surgery per Dr. Jean in past; had injured back in past when caring for father; has pain in lower back/SI region with prolonged walking or standing and then will go from bilateral lower back and down to buttocks and left leg; no bladder or bowel dysfunction;   also has discomfort in multiple joints; sister and mother had RA;      F/U anxiety: takes Sertraline daily and works well; no problems with sleep; no SI/HI.  Had irritability with Wellbutrin    Takes Claritin daily and Flonase as needed and works well.      The following  portions of the patient's history were reviewed and updated as appropriate: allergies, current medications, past family history, past medical history, past social history, past surgical history and problem list.    Current Outpatient Medications on File Prior to Visit   Medication Sig    Cholecalciferol (VITAMIN D-3 PO) Take  by mouth.    fluticasone (FLONASE) 50 MCG/ACT nasal spray 2 sprays into the nostril(s) as directed by provider Daily.    Loratadine 10 MG capsule Take 1 capsule by mouth Daily.    MAGNESIUM PO Take  by mouth.     No current facility-administered medications on file prior to visit.        Current Outpatient Medications   Medication Sig Dispense Refill    celecoxib (CeleBREX) 200 MG capsule Take 1 capsule by mouth Daily. 90 capsule 1    Cholecalciferol (VITAMIN D-3 PO) Take  by mouth.      fluticasone (FLONASE) 50 MCG/ACT nasal spray 2 sprays into the nostril(s) as directed by provider Daily.      lisinopril (PRINIVIL,ZESTRIL) 10 MG tablet Take 1 tablet by mouth Every Evening. 90 tablet 1    Loratadine 10 MG capsule Take 1 capsule by mouth Daily.      MAGNESIUM PO Take  by mouth.      sertraline (ZOLOFT) 100 MG tablet Take 1 tablet by mouth Daily. 90 tablet 1     No current facility-administered medications for this visit.       Past Medical History:   Diagnosis Date    Abdominal discomfort, epigastric 07/14/2022    Allergic     Allergic rhinitis     Anxiety     Arthritis     Chronic pain     Chronic pain of left knee 05/02/2017    High cholesterol     History of total knee arthroplasty, left 05/02/2018    Hypertension     Left knee pain     Left leg pain     Prediabetes     S/P total knee arthroplasty, left 11/28/2017    Sciatic leg pain     LEFT    Status post total left knee replacement 01/03/2018       Past Surgical History:   Procedure Laterality Date    BACK SURGERY  2012    Micro-Discectomy L5-S1 - Boom Smith MD    BREAST BIOPSY Right 2000    benign--fibrocystic    CHOLECYSTECTOMY   2002    COLONOSCOPY  2009    JOINT REPLACEMENT Right 2015    LASIK  1999    SINUS SURGERY  2005    and 2010    TOTAL KNEE ARTHROPLASTY Left 11/15/2017    Procedure: LEFT TOTAL KNEE ARTHROPLASTY WITH SARAH NAVIGATION; Jose Muñoz MD;   [facility]    TOTAL KNEE ARTHROPLASTY Right 2015       Family History   Problem Relation Age of Onset    Hypertension Mother     Arthritis Mother         Rheumatoid dx at 82    Rheum arthritis Mother     Hypertension Father     Hypertension Sister     Arthritis Sister         Dx 63 2022    Rheum arthritis Sister     Malig Hyperthermia Neg Hx        Social History     Socioeconomic History    Marital status: Single   Tobacco Use    Smoking status: Never    Smokeless tobacco: Never   Substance and Sexual Activity    Alcohol use: Yes     Alcohol/week: 1.0 standard drink     Types: 1 Glasses of wine per week     Comment: Rare    Drug use: Never    Sexual activity: Never       Review of Systems   Constitutional:  Negative for appetite change, chills, fatigue, fever, unexpected weight gain and unexpected weight loss.   HENT:  Negative for ear pain (had recent trouble with discomfort in right ear, resolved with treatment of allergies), sinus pressure, sore throat and trouble swallowing.    Respiratory:  Negative for cough, chest tightness and shortness of breath.    Cardiovascular:  Negative for chest pain and palpitations.   Gastrointestinal:  Negative for abdominal pain, blood in stool, constipation, diarrhea, GERD and indigestion.   Endocrine: Negative for cold intolerance and polydipsia.   Genitourinary:  Negative for dysuria and frequency.   Musculoskeletal:  Back pain: see HPI.   Skin:  Negative for rash and skin lesions.   Neurological:  Negative for syncope and weakness.   Hematological:  Does not bruise/bleed easily.   Psychiatric/Behavioral:  Negative for suicidal ideas.      Objective   Vitals:    07/28/23 0949   BP: 124/74   BP Location: Left arm   Patient Position:  "Sitting   Cuff Size: Adult   Pulse: 70   Temp: 98.2 °F (36.8 °C)   SpO2: 97%   Weight: 92.1 kg (203 lb)   Height: 152.4 cm (60\")     Body mass index is 39.65 kg/m².    Physical Exam  Vitals and nursing note reviewed.   Constitutional:       General: She is not in acute distress.     Appearance: She is well-developed and well-groomed. She is not diaphoretic.   HENT:      Head: Normocephalic and atraumatic.      Jaw: No tenderness or pain on movement.      Right Ear: External ear normal. No decreased hearing noted. Right ear middle ear effusion: mild. Tympanic membrane is not erythematous.      Left Ear: External ear normal. No decreased hearing noted. Left ear middle ear effusion: mild. Tympanic membrane is not erythematous.      Nose: Nose normal.      Right Sinus: No maxillary sinus tenderness or frontal sinus tenderness.      Left Sinus: No maxillary sinus tenderness or frontal sinus tenderness.      Mouth/Throat:      Mouth: Mucous membranes are moist.      Pharynx: No oropharyngeal exudate or posterior oropharyngeal erythema.   Eyes:      Extraocular Movements: Extraocular movements intact.      Conjunctiva/sclera: Conjunctivae normal.      Pupils: Pupils are equal, round, and reactive to light.   Neck:      Thyroid: No thyromegaly.      Vascular: No carotid bruit.      Trachea: No tracheal deviation.   Cardiovascular:      Rate and Rhythm: Normal rate and regular rhythm.      Pulses: Normal pulses.      Heart sounds: Normal heart sounds. No murmur heard.  Pulmonary:      Effort: Pulmonary effort is normal. No respiratory distress.      Breath sounds: Normal breath sounds.   Abdominal:      General: Bowel sounds are normal.      Palpations: Abdomen is soft. There is no hepatomegaly or splenomegaly.      Tenderness: There is no abdominal tenderness. There is no guarding.   Musculoskeletal:         General: Normal range of motion.      Cervical back: Normal range of motion and neck supple. No bony tenderness.      " Thoracic back: No bony tenderness.      Lumbar back: No bony tenderness.      Right lower leg: No edema.      Left lower leg: No edema.   Lymphadenopathy:      Cervical: No cervical adenopathy.   Skin:     General: Skin is warm and dry.      Findings: No rash.   Neurological:      Mental Status: She is alert and oriented to person, place, and time.      Cranial Nerves: No cranial nerve deficit.      Motor: Motor function is intact.      Coordination: Coordination normal.      Gait: Gait normal.      Deep Tendon Reflexes: Reflexes are normal and symmetric.   Psychiatric:         Mood and Affect: Mood normal.         Behavior: Behavior normal.         Thought Content: Thought content normal.         Cognition and Memory: Cognition normal.         Judgment: Judgment normal.       Lab Results   Component Value Date    WBC 5.7 11/09/2022    RBC 4.02 11/09/2022    HGB 12.4 11/09/2022    HCT 37.0 11/09/2022    MCV 92 11/09/2022    MCH 30.8 11/09/2022    MCHC 33.5 11/09/2022    RDW 12.8 11/09/2022    RDWSD 44.6 11/07/2017    MPV 9.5 11/07/2017     11/09/2022    NEUTRORELPCT 64 11/09/2022    LYMPHORELPCT 24 11/09/2022    MONORELPCT 6 11/09/2022    EOSRELPCT 5 11/09/2022    BASORELPCT 1 11/09/2022    NEUTROABS 3.7 11/09/2022    LYMPHSABS 1.4 11/09/2022    MONOSABS 0.3 11/09/2022    EOSABS 0.3 11/09/2022    BASOSABS 0.1 11/09/2022     Lab Results   Component Value Date    GLUCOSE 100 (H) 01/27/2023    BUN 18 01/27/2023    CREATININE 0.79 01/27/2023    EGFRIFNONA 83 11/16/2017    BCR 23 01/27/2023    K 4.9 01/27/2023    CO2 24 01/27/2023    CALCIUM 9.7 01/27/2023    PROTENTOTREF 6.9 01/27/2023    ALBUMIN 4.5 01/27/2023    LABIL2 1.9 01/27/2023    AST 18 01/27/2023    ALT 14 01/27/2023      Lab Results   Component Value Date    CHLPL 268 (H) 01/27/2023    TRIG 217 (H) 01/27/2023    HDL 43 01/27/2023    VLDL 41 (H) 01/27/2023     (H) 01/27/2023     Lab Results   Component Value Date    TSH 1.450 07/13/2022     Lab  Results   Component Value Date    HGBA1C 5.9 (H) 01/27/2023     Lab Results   Component Value Date    LABPH 7.5 05/12/2015    COLORU Yellow 11/07/2017    CLARITYU Clear 11/07/2017    LEUKOCYTESUR Negative 11/07/2017    GLUCOSEU Negative 11/07/2017    BLOODU Negative 11/07/2017    BILIRUBINUR Negative 11/07/2017    NITRITEU Negative 11/07/2017          Assessment    Problem List Items Addressed This Visit       Hypertension    Current Assessment & Plan     Hypertension is  stable .  Weight loss.  Regular aerobic exercise.  Continue current medications.  Ambulatory blood pressure monitoring.  Blood pressure will be reassessed at the next regular appointment.  Continue Lisinopril daily.         Relevant Medications    lisinopril (PRINIVIL,ZESTRIL) 10 MG tablet    Other Relevant Orders    Lipid Panel With LDL / HDL Ratio    CBC & Differential    Comprehensive Metabolic Panel    TSH Rfx On Abnormal To Free T4    Low back pain    Current Assessment & Plan     Continue Celebrex daily and Tylenol nightly as needed.         Mixed hyperlipidemia    Current Assessment & Plan     Patient declines statin or other cholesterol medication.  Continue to work on healthy diet and exercise.         Relevant Orders    Lipid Panel With LDL / HDL Ratio    Comprehensive Metabolic Panel    Prediabetes    Current Assessment & Plan     Continue to watch carbs/sugars in diet.         Relevant Orders    Hemoglobin A1c    Anxiety    Current Assessment & Plan     Stable.  Continue Sertraline daily.         Relevant Medications    sertraline (ZOLOFT) 100 MG tablet    Arthralgia of multiple joints    Current Assessment & Plan     Stable.  Continue Celebrex daily and Tylenol nightly as needed.         Relevant Medications    celecoxib (CeleBREX) 200 MG capsule    Class 3 severe obesity with serious comorbidity and body mass index (BMI) of 40.0 to 44.9 in adult    Current Assessment & Plan     Patient's (Body mass index is 39.65 kg/m².) indicates  that they are morbidly/severely obese (BMI > 40 or > 35 with obesity - related health condition) with health conditions that include hypertension, diabetes mellitus, and dyslipidemias . Weight is improving with lifestyle modifications. BMI  is above average; BMI management plan is completed. We discussed low calorie, low carb based diet program, portion control, and increasing exercise.           Other Visit Diagnoses       Encounter for annual physical exam    -  Primary    Relevant Orders    Lipid Panel With LDL / HDL Ratio    CBC & Differential    Comprehensive Metabolic Panel    Hemoglobin A1c    TSH Rfx On Abnormal To Free T4             Return in about 1 year (around 7/28/2024) for Annual physical, Recheck.or sooner if symptoms persist or worsen.     Impression: Health maintenance visit.  Currently, eats a healthy diet and has an inadequate exercise routine.  Cervical cancer screening: no longer indicated.  Breast cancer screening: pt declines, accepts risks.  Bone density screening: pt declines.  Colorectal cancer screening: pt declines repeat.  Screening lab work includes: CMP, lipid.  Immunizations: pt declines; risks and benefits of immunizations were discussed with patient.  Patient was advised to be evaluated by ophthalmology.  Advice and education were given regarding nutrition and aerobic exercise.

## 2023-07-28 NOTE — PATIENT INSTRUCTIONS
Continue to monitor your blood pressure periodically and record results.  Continue to work on healthy diet and exercise.  Follow up pending lab results.  Follow up in 1 year for physical, or sooner if problems or concerns.

## 2023-07-29 LAB
ALBUMIN SERPL-MCNC: 4.5 G/DL (ref 3.9–4.9)
ALBUMIN/GLOB SERPL: 1.6 {RATIO} (ref 1.2–2.2)
ALP SERPL-CCNC: 86 IU/L (ref 44–121)
ALT SERPL-CCNC: 15 IU/L (ref 0–32)
AST SERPL-CCNC: 20 IU/L (ref 0–40)
BASOPHILS # BLD AUTO: 0.1 X10E3/UL (ref 0–0.2)
BASOPHILS NFR BLD AUTO: 1 %
BILIRUB SERPL-MCNC: <0.2 MG/DL (ref 0–1.2)
BUN SERPL-MCNC: 30 MG/DL (ref 8–27)
BUN/CREAT SERPL: 33 (ref 12–28)
CALCIUM SERPL-MCNC: 9.8 MG/DL (ref 8.7–10.3)
CHLORIDE SERPL-SCNC: 107 MMOL/L (ref 96–106)
CHOLEST SERPL-MCNC: 295 MG/DL (ref 100–199)
CO2 SERPL-SCNC: 18 MMOL/L (ref 20–29)
CREAT SERPL-MCNC: 0.92 MG/DL (ref 0.57–1)
EGFRCR SERPLBLD CKD-EPI 2021: 67 ML/MIN/1.73
EOSINOPHIL # BLD AUTO: 0 X10E3/UL (ref 0–0.4)
EOSINOPHIL NFR BLD AUTO: 1 %
ERYTHROCYTE [DISTWIDTH] IN BLOOD BY AUTOMATED COUNT: 12.6 % (ref 11.7–15.4)
GLOBULIN SER CALC-MCNC: 2.8 G/DL (ref 1.5–4.5)
GLUCOSE SERPL-MCNC: 111 MG/DL (ref 70–99)
HBA1C MFR BLD: 5.3 % (ref 4.8–5.6)
HCT VFR BLD AUTO: 44.1 % (ref 34–46.6)
HDLC SERPL-MCNC: 46 MG/DL
HGB BLD-MCNC: 15 G/DL (ref 11.1–15.9)
IMM GRANULOCYTES # BLD AUTO: 0 X10E3/UL (ref 0–0.1)
IMM GRANULOCYTES NFR BLD AUTO: 0 %
LABORATORY COMMENT REPORT: ABNORMAL
LDLC SERPL CALC-MCNC: 210 MG/DL (ref 0–99)
LDLC/HDLC SERPL: 4.6 RATIO (ref 0–3.2)
LYMPHOCYTES # BLD AUTO: 1.1 X10E3/UL (ref 0.7–3.1)
LYMPHOCYTES NFR BLD AUTO: 27 %
MCH RBC QN AUTO: 32.3 PG (ref 26.6–33)
MCHC RBC AUTO-ENTMCNC: 34 G/DL (ref 31.5–35.7)
MCV RBC AUTO: 95 FL (ref 79–97)
MONOCYTES # BLD AUTO: 0.4 X10E3/UL (ref 0.1–0.9)
MONOCYTES NFR BLD AUTO: 9 %
NEUTROPHILS # BLD AUTO: 2.6 X10E3/UL (ref 1.4–7)
NEUTROPHILS NFR BLD AUTO: 62 %
PLATELET # BLD AUTO: 277 X10E3/UL (ref 150–450)
POTASSIUM SERPL-SCNC: 5.9 MMOL/L (ref 3.5–5.2)
PROT SERPL-MCNC: 7.3 G/DL (ref 6–8.5)
RBC # BLD AUTO: 4.65 X10E6/UL (ref 3.77–5.28)
SODIUM SERPL-SCNC: 141 MMOL/L (ref 134–144)
TRIGL SERPL-MCNC: 200 MG/DL (ref 0–149)
TSH SERPL DL<=0.005 MIU/L-ACNC: 2.02 UIU/ML (ref 0.45–4.5)
VLDLC SERPL CALC-MCNC: 39 MG/DL (ref 5–40)
WBC # BLD AUTO: 4.2 X10E3/UL (ref 3.4–10.8)

## 2023-07-29 NOTE — ASSESSMENT & PLAN NOTE
Patient's (Body mass index is 39.65 kg/m².) indicates that they are morbidly/severely obese (BMI > 40 or > 35 with obesity - related health condition) with health conditions that include hypertension, diabetes mellitus, and dyslipidemias . Weight is improving with lifestyle modifications. BMI  is above average; BMI management plan is completed. We discussed low calorie, low carb based diet program, portion control, and increasing exercise.

## 2023-07-29 NOTE — ASSESSMENT & PLAN NOTE
Hypertension is  stable .  Weight loss.  Regular aerobic exercise.  Continue current medications.  Ambulatory blood pressure monitoring.  Blood pressure will be reassessed at the next regular appointment.  Continue Lisinopril daily.

## 2023-07-29 NOTE — ASSESSMENT & PLAN NOTE
Patient declines statin or other cholesterol medication.  Continue to work on healthy diet and exercise.

## 2023-07-31 DIAGNOSIS — E87.5 HYPERKALEMIA: Primary | ICD-10-CM

## 2024-01-02 DIAGNOSIS — M25.50 ARTHRALGIA OF MULTIPLE JOINTS: ICD-10-CM

## 2024-01-02 DIAGNOSIS — F41.9 ANXIETY: ICD-10-CM

## 2024-01-02 RX ORDER — CELECOXIB 200 MG/1
200 CAPSULE ORAL DAILY
Qty: 90 CAPSULE | Refills: 0 | Status: SHIPPED | OUTPATIENT
Start: 2024-01-02

## 2024-01-02 RX ORDER — SERTRALINE HYDROCHLORIDE 100 MG/1
100 TABLET, FILM COATED ORAL DAILY
Qty: 90 TABLET | Refills: 0 | Status: SHIPPED | OUTPATIENT
Start: 2024-01-02

## 2024-06-17 DIAGNOSIS — I10 PRIMARY HYPERTENSION: ICD-10-CM

## 2024-06-17 DIAGNOSIS — F41.9 ANXIETY: ICD-10-CM

## 2024-06-17 DIAGNOSIS — M25.50 ARTHRALGIA OF MULTIPLE JOINTS: ICD-10-CM

## 2024-06-17 RX ORDER — SERTRALINE HYDROCHLORIDE 100 MG/1
100 TABLET, FILM COATED ORAL DAILY
Qty: 90 TABLET | Refills: 0 | Status: SHIPPED | OUTPATIENT
Start: 2024-06-17

## 2024-06-17 RX ORDER — CELECOXIB 200 MG/1
200 CAPSULE ORAL DAILY
Qty: 90 CAPSULE | Refills: 0 | Status: SHIPPED | OUTPATIENT
Start: 2024-06-17

## 2024-06-17 RX ORDER — LISINOPRIL 10 MG/1
10 TABLET ORAL EVERY EVENING
Qty: 90 TABLET | Refills: 0 | Status: SHIPPED | OUTPATIENT
Start: 2024-06-17

## 2024-09-05 DIAGNOSIS — F41.9 ANXIETY: ICD-10-CM

## 2024-09-05 DIAGNOSIS — I10 PRIMARY HYPERTENSION: ICD-10-CM

## 2024-09-05 DIAGNOSIS — M25.50 ARTHRALGIA OF MULTIPLE JOINTS: ICD-10-CM

## 2024-09-06 RX ORDER — CELECOXIB 200 MG/1
200 CAPSULE ORAL DAILY
Qty: 90 CAPSULE | Refills: 0 | Status: SHIPPED | OUTPATIENT
Start: 2024-09-06

## 2024-09-06 RX ORDER — LISINOPRIL 10 MG/1
10 TABLET ORAL EVERY EVENING
Qty: 90 TABLET | Refills: 0 | Status: SHIPPED | OUTPATIENT
Start: 2024-09-06

## 2024-09-06 RX ORDER — SERTRALINE HYDROCHLORIDE 100 MG/1
100 TABLET, FILM COATED ORAL DAILY
Qty: 90 TABLET | Refills: 0 | Status: SHIPPED | OUTPATIENT
Start: 2024-09-06

## 2024-09-06 NOTE — TELEPHONE ENCOUNTER
Rx Refill Note  Requested Prescriptions     Pending Prescriptions Disp Refills    sertraline (ZOLOFT) 100 MG tablet [Pharmacy Med Name: sertraline 100 mg tablet] 90 tablet 0     Sig: TAKE ONE TABLET BY MOUTH DAILY    lisinopril (PRINIVIL,ZESTRIL) 10 MG tablet [Pharmacy Med Name: lisinopril 10 mg tablet] 90 tablet 0     Sig: TAKE ONE TABLET BY MOUTH EVERY EVENING    celecoxib (CeleBREX) 200 MG capsule [Pharmacy Med Name: celecoxib 200 mg capsule] 90 capsule 0     Sig: TAKE ONE CAPSULE BY MOUTH DAILY      Last office visit with prescribing clinician: 7/28/2023   Last telemedicine visit with prescribing clinician: Visit date not found   Next office visit with prescribing clinician: Visit date not found                         Would you like a call back once the refill request has been completed: [] Yes [] No    If the office needs to give you a call back, can they leave a voicemail: [] Yes [] No    Zohra Rasheed MA  09/06/24, 09:34 EDT

## 2024-09-11 ENCOUNTER — OFFICE VISIT (OUTPATIENT)
Dept: FAMILY MEDICINE CLINIC | Facility: CLINIC | Age: 69
End: 2024-09-11
Payer: COMMERCIAL

## 2024-09-11 VITALS
BODY MASS INDEX: 39.78 KG/M2 | HEIGHT: 60 IN | TEMPERATURE: 98 F | OXYGEN SATURATION: 98 % | SYSTOLIC BLOOD PRESSURE: 148 MMHG | DIASTOLIC BLOOD PRESSURE: 94 MMHG | HEART RATE: 70 BPM | WEIGHT: 202.6 LBS

## 2024-09-11 DIAGNOSIS — M79.10 MYALGIA: ICD-10-CM

## 2024-09-11 DIAGNOSIS — F41.9 ANXIETY: ICD-10-CM

## 2024-09-11 DIAGNOSIS — M25.50 ARTHRALGIA OF MULTIPLE JOINTS: Primary | ICD-10-CM

## 2024-09-11 DIAGNOSIS — I10 PRIMARY HYPERTENSION: ICD-10-CM

## 2024-09-11 DIAGNOSIS — J30.9 ALLERGIC RHINITIS, UNSPECIFIED SEASONALITY, UNSPECIFIED TRIGGER: ICD-10-CM

## 2024-09-11 PROCEDURE — 99214 OFFICE O/P EST MOD 30 MIN: CPT | Performed by: NURSE PRACTITIONER

## 2024-09-11 NOTE — PATIENT INSTRUCTIONS
Try Diclofenac instead of Ibuprofen or other NSAIDs.  Try ice/heat, whichever feels better.  Continue to monitor your blood pressure periodically and record results.  Continue to work on healthy diet.  Follow up pending lab results.  Follow up in 6 weeks for physical with fasting labs, or sooner if symptoms persist or worsen.

## 2024-09-11 NOTE — PROGRESS NOTES
Subjective   Risa Jimenez is a 69 y.o. female.     Chief Complaint   Patient presents with    Pain     She has problems with chronic pain but over the past three days she is in a lot of pain. All of the antiinflammatories have not worked and her anti inflammatory diet is not working. She said she has never had pain like this       History of Present Illness   Patient presents with c/o pain all over; has trouble with chronic pain, but worse in last 3 days; was taking Celebrex daily and Tylenol nightly as needed and but had not been working as well recently so stopped taking Celebrex about 1 month ago since did not seem to be helping much; tried taking Tylenol 1000 mg twice daily, but did not help; tried left over Meloxicam and did not help; then 3 days ago pain much worse; has not been able to do things due to discomfort; mother had RA diagnosed at age 80 years and sister was diagnosed at age 64 years; has discomfort everywhere; not a place on her body that does not hurt; hurts to raise arms over head due to shoulders hurt so bad; no fever; no malaise, just has bad pain; no heartburn or reflux; was still able to function with discomfort over the last couple of months, but not able to function at this point.    Has tried anti-inflammatory diet for last 2 weeks and thought had helped some, but then felt much worse 3 days ago.    Will be walking and back will stiffen up; has discomfort in all of back; has had chronic back pain since injury in past, but worse.    F/U HTN: takes Lisinopril daily; may have missed a dose last night since BP elevated today; does not typically monitor BP; no headaches other than some with sinus pressure at times; has bad allergies, worse in fall and spring; sees ENT Dr. Brice; has not been sleeping good; resumed Claritin yesterday, but has not helped; no orthostasis; no swelling; noted some blood in right ear several days ago; no ear pain; has more trouble with sinuses on right; typically  uses Flonase most days.    F/U anxiety: takes Sertraline daily and helps; frustrated much of the time, but happy with current dose; no SI/HI.      The following portions of the patient's history were reviewed and updated as appropriate: allergies, current medications, past family history, past medical history, past social history, past surgical history and problem list.    Current Outpatient Medications on File Prior to Visit   Medication Sig    Cholecalciferol (VITAMIN D-3 PO) Take  by mouth.    fluticasone (FLONASE) 50 MCG/ACT nasal spray 2 sprays into the nostril(s) as directed by provider Daily.    lisinopril (PRINIVIL,ZESTRIL) 10 MG tablet TAKE ONE TABLET BY MOUTH EVERY EVENING    Loratadine 10 MG capsule Take 1 capsule by mouth Daily.    MAGNESIUM PO Take  by mouth.    sertraline (ZOLOFT) 100 MG tablet TAKE ONE TABLET BY MOUTH DAILY    [DISCONTINUED] celecoxib (CeleBREX) 200 MG capsule TAKE ONE CAPSULE BY MOUTH DAILY     No current facility-administered medications on file prior to visit.        Past Medical History:   Diagnosis Date    Abdominal discomfort, epigastric 07/14/2022    Allergic     Allergic rhinitis     Anxiety     Arthritis     Chronic pain     Chronic pain of left knee 05/02/2017    High cholesterol     History of total knee arthroplasty, left 05/02/2018    Hypertension     Left knee pain     Left leg pain     Prediabetes     S/P total knee arthroplasty, left 11/28/2017    Sciatic leg pain     LEFT    Status post total left knee replacement 01/03/2018       Past Surgical History:   Procedure Laterality Date    BACK SURGERY  2012    Micro-Discectomy L5-S1 - Boom Smith MD    BREAST BIOPSY Right 2000    benign--fibrocystic    CHOLECYSTECTOMY  2002    COLONOSCOPY  2009    JOINT REPLACEMENT Right 2015    LASIK  1999    SINUS SURGERY  2005    and 2010    TOTAL KNEE ARTHROPLASTY Left 11/15/2017    Procedure: LEFT TOTAL KNEE ARTHROPLASTY WITH SARAH NAVIGATION; Jose Muñoz MD;   [facility]  "OR    TOTAL KNEE ARTHROPLASTY Right 2015       Family History   Problem Relation Age of Onset    Hypertension Mother     Arthritis Mother         Rheumatoid dx at 82    Rheum arthritis Mother     Hypertension Father     Hypertension Sister     Arthritis Sister         Dx 63 2022    Rheum arthritis Sister     Malig Hyperthermia Neg Hx        Social History     Socioeconomic History    Marital status: Single   Tobacco Use    Smoking status: Never    Smokeless tobacco: Never   Substance and Sexual Activity    Alcohol use: Yes     Alcohol/week: 1.0 standard drink of alcohol     Types: 1 Glasses of wine per week     Comment: Rare    Drug use: Never    Sexual activity: Never       Review of Systems   Constitutional:  Positive for fatigue (seems to tire easily). Negative for appetite change, chills, fever, unexpected weight gain and unexpected weight loss (will lose 6-8 pounds and then regain weight).   HENT:  Negative for sinus pressure and trouble swallowing.    Eyes:  Negative for blurred vision.   Respiratory:  Negative for cough, chest tightness and shortness of breath.    Cardiovascular:  Negative for chest pain and palpitations.   Gastrointestinal:  Negative for abdominal pain, blood in stool, constipation, diarrhea, GERD and indigestion.   Endocrine: Negative for polydipsia.   Genitourinary:  Negative for dysuria and frequency.   Musculoskeletal:  Arthralgias: see HPI. Back pain: see HPI.   Skin:  Negative for rash.   Neurological:  Negative for syncope and weakness.       Objective   Vitals:    09/11/24 1416   BP: 148/94   BP Location: Left arm   Patient Position: Sitting   Cuff Size: Adult   Pulse: 70   Temp: 98 °F (36.7 °C)   TempSrc: Temporal   SpO2: 98%   Weight: 91.9 kg (202 lb 9.6 oz)   Height: 152.4 cm (60\")     Body mass index is 39.57 kg/m².    Physical Exam  Vitals and nursing note reviewed.   Constitutional:       General: She is not in acute distress.     Appearance: She is well-developed and " well-groomed. She is not diaphoretic.   HENT:      Head: Normocephalic.      Right Ear: External ear normal. No decreased hearing noted. Right ear middle ear effusion: mild. Tympanic membrane is not erythematous.      Left Ear: External ear normal. No decreased hearing noted. A middle ear effusion is present. Tympanic membrane is not erythematous.      Ears:        Nose: Nose normal.      Right Sinus: No maxillary sinus tenderness or frontal sinus tenderness.      Left Sinus: No maxillary sinus tenderness or frontal sinus tenderness.      Mouth/Throat:      Mouth: Mucous membranes are moist.      Pharynx: No oropharyngeal exudate (mild drainage in posterior pharynx). Posterior oropharyngeal erythema: mild.  Eyes:      Conjunctiva/sclera: Conjunctivae normal.   Neck:      Vascular: No carotid bruit.   Cardiovascular:      Rate and Rhythm: Normal rate and regular rhythm.      Pulses: Normal pulses.      Heart sounds: Normal heart sounds. No murmur heard.  Pulmonary:      Effort: Pulmonary effort is normal. No respiratory distress.      Breath sounds: Normal breath sounds.   Abdominal:      General: Bowel sounds are normal.      Palpations: Abdomen is soft. There is no hepatomegaly or splenomegaly.      Tenderness: There is no abdominal tenderness. There is no guarding.   Musculoskeletal:      Right shoulder: Decreased range of motion: some due to discomfort.      Left shoulder: Decreased range of motion: some due to discomfort.      Right hand: Decreased strength: strength equal bilaterally.     Left hand: Decreased strength: strength equal bilaterally.     Cervical back: Normal range of motion and neck supple. No bony tenderness.      Thoracic back: No bony tenderness.      Lumbar back: No bony tenderness.      Right lower leg: No edema.      Left lower leg: No edema.      Comments: Facial grimaces with movement in general, particularly shoulders   Lymphadenopathy:      Cervical: No cervical adenopathy.   Skin:      General: Skin is warm and dry.      Findings: No rash.   Neurological:      Mental Status: She is alert and oriented to person, place, and time.      Gait: Abnormal gait: guarded gait.   Psychiatric:         Mood and Affect: Mood normal.         Behavior: Behavior normal.         Thought Content: Thought content normal.         Cognition and Memory: Cognition normal.         Judgment: Judgment normal.         Lab Results   Component Value Date    WBC 4.2 07/28/2023    RBC 4.65 07/28/2023    HGB 15.0 07/28/2023    HCT 44.1 07/28/2023    MCV 95 07/28/2023    MCH 32.3 07/28/2023    MCHC 34.0 07/28/2023    RDW 12.6 07/28/2023    RDWSD 44.6 11/07/2017    MPV 9.5 11/07/2017     07/28/2023    NEUTRORELPCT 62 07/28/2023    LYMPHORELPCT 27 07/28/2023    MONORELPCT 9 07/28/2023    EOSRELPCT 1 07/28/2023    BASORELPCT 1 07/28/2023    NEUTROABS 2.6 07/28/2023    LYMPHSABS 1.1 07/28/2023    MONOSABS 0.4 07/28/2023    EOSABS 0.0 07/28/2023    BASOSABS 0.1 07/28/2023     Lab Results   Component Value Date    GLUCOSE 109 (H) 08/17/2023    BUN 28 (H) 08/17/2023    CREATININE 0.94 08/17/2023    EGFRIFNONA 83 11/16/2017    BCR 30 (H) 08/17/2023    K 4.8 08/17/2023    CO2 22 08/17/2023    CALCIUM 9.5 08/17/2023    PROTENTOTREF 7.3 07/28/2023    ALBUMIN 4.5 07/28/2023    LABIL2 1.6 07/28/2023    AST 20 07/28/2023    ALT 15 07/28/2023      Lab Results   Component Value Date    CHLPL 295 (H) 07/28/2023    TRIG 200 (H) 07/28/2023    HDL 46 07/28/2023    VLDL 39 07/28/2023     (H) 07/28/2023     Lab Results   Component Value Date    TSH 2.020 07/28/2023     Lab Results   Component Value Date    HGBA1C 5.3 07/28/2023     Lab Results   Component Value Date    LABPH 7.5 05/12/2015    COLORU Yellow 11/07/2017    CLARITYU Clear 11/07/2017    LEUKOCYTESUR Negative 11/07/2017    GLUCOSEU Negative 11/07/2017    BLOODU Negative 11/07/2017    BILIRUBINUR Negative 11/07/2017    NITRITEU Negative 11/07/2017          Assessment    Problem  List Items Addressed This Visit       Hypertension    Current Assessment & Plan     Hypertension is a little increased today, but pt may have missed dose of medication last night due to pain.  Continue current treatment regimen.  Weight loss.  Regular aerobic exercise.  Ambulatory blood pressure monitoring.  Blood pressure will be reassessed  6 weeks .  Continue Lisinopril daily.         Anxiety    Current Assessment & Plan     Stable.  Continue Sertraline daily.         Arthralgia of multiple joints - Primary    Current Assessment & Plan     Try Diclofenac instead of Ibuprofen or other NSAIDs.  Try ice/heat, whichever feels better.         Relevant Medications    diclofenac (VOLTAREN) 50 MG EC tablet    Other Relevant Orders    Comprehensive Metabolic Panel    IRENA With / DsDNA, RNP, Sjogrens A / B, Chamberlain    CBC & Differential    Rheumatoid Factor    Uric Acid    C-reactive Protein    Sedimentation Rate    CK    Allergic rhinitis    Current Assessment & Plan     Not well controlled.  Recently started Claritin.  Continue Claritin and Flonase daily.         Relevant Medications    diclofenac (VOLTAREN) 50 MG EC tablet    Myalgia    Relevant Orders    Comprehensive Metabolic Panel    CK          Return in about 6 weeks (around 10/23/2024) for Annual physical, Recheck.or sooner if symptoms persist or worsen.  Will check labs today for further evaluation of symptoms; will also try Diclofenac since Celebrex, Meloxicam, Ibuprofen, Tylenol, etc have not helped; will consider Rx for Prednisone pending lab results if symptoms persist or worsen.

## 2024-09-12 PROBLEM — M79.10 MYALGIA: Status: ACTIVE | Noted: 2024-09-12

## 2024-09-12 LAB
ALBUMIN SERPL-MCNC: 4.4 G/DL (ref 3.8–4.8)
ALP SERPL-CCNC: 77 IU/L (ref 44–121)
ALT SERPL-CCNC: 17 IU/L (ref 0–32)
ANA SER QL: NEGATIVE
AST SERPL-CCNC: 17 IU/L (ref 0–40)
BASOPHILS # BLD AUTO: 0.1 X10E3/UL (ref 0–0.2)
BASOPHILS NFR BLD AUTO: 1 %
BILIRUB SERPL-MCNC: <0.2 MG/DL (ref 0–1.2)
BUN SERPL-MCNC: 22 MG/DL (ref 8–27)
BUN/CREAT SERPL: 29 (ref 12–28)
CALCIUM SERPL-MCNC: 10 MG/DL (ref 8.7–10.3)
CHLORIDE SERPL-SCNC: 102 MMOL/L (ref 96–106)
CK SERPL-CCNC: 85 U/L (ref 32–182)
CO2 SERPL-SCNC: 25 MMOL/L (ref 20–29)
CREAT SERPL-MCNC: 0.77 MG/DL (ref 0.57–1)
CRP SERPL-MCNC: 6 MG/L (ref 0–10)
EGFRCR SERPLBLD CKD-EPI 2021: 82 ML/MIN/1.73
EOSINOPHIL # BLD AUTO: 0.3 X10E3/UL (ref 0–0.4)
EOSINOPHIL NFR BLD AUTO: 4 %
ERYTHROCYTE [DISTWIDTH] IN BLOOD BY AUTOMATED COUNT: 12.4 % (ref 11.7–15.4)
ERYTHROCYTE [SEDIMENTATION RATE] IN BLOOD BY WESTERGREN METHOD: 44 MM/HR (ref 0–40)
GLOBULIN SER CALC-MCNC: 2.8 G/DL (ref 1.5–4.5)
GLUCOSE SERPL-MCNC: 90 MG/DL (ref 70–99)
HCT VFR BLD AUTO: 38.6 % (ref 34–46.6)
HGB BLD-MCNC: 13 G/DL (ref 11.1–15.9)
IMM GRANULOCYTES # BLD AUTO: 0 X10E3/UL (ref 0–0.1)
IMM GRANULOCYTES NFR BLD AUTO: 0 %
LYMPHOCYTES # BLD AUTO: 2.1 X10E3/UL (ref 0.7–3.1)
LYMPHOCYTES NFR BLD AUTO: 29 %
MCH RBC QN AUTO: 31.7 PG (ref 26.6–33)
MCHC RBC AUTO-ENTMCNC: 33.7 G/DL (ref 31.5–35.7)
MCV RBC AUTO: 94 FL (ref 79–97)
MONOCYTES # BLD AUTO: 0.5 X10E3/UL (ref 0.1–0.9)
MONOCYTES NFR BLD AUTO: 7 %
NEUTROPHILS # BLD AUTO: 4.3 X10E3/UL (ref 1.4–7)
NEUTROPHILS NFR BLD AUTO: 59 %
PLATELET # BLD AUTO: 326 X10E3/UL (ref 150–450)
POTASSIUM SERPL-SCNC: 4.7 MMOL/L (ref 3.5–5.2)
PROT SERPL-MCNC: 7.2 G/DL (ref 6–8.5)
RBC # BLD AUTO: 4.1 X10E6/UL (ref 3.77–5.28)
RHEUMATOID FACT SERPL-ACNC: 10.9 IU/ML
SODIUM SERPL-SCNC: 140 MMOL/L (ref 134–144)
URATE SERPL-MCNC: 6 MG/DL (ref 3.1–7.9)
WBC # BLD AUTO: 7.2 X10E3/UL (ref 3.4–10.8)

## 2024-09-12 NOTE — ASSESSMENT & PLAN NOTE
Hypertension is a little increased today, but pt may have missed dose of medication last night due to pain.  Continue current treatment regimen.  Weight loss.  Regular aerobic exercise.  Ambulatory blood pressure monitoring.  Blood pressure will be reassessed  6 weeks .  Continue Lisinopril daily.

## 2024-09-18 DIAGNOSIS — M25.50 ARTHRALGIA OF MULTIPLE JOINTS: ICD-10-CM

## 2024-09-18 RX ORDER — CELECOXIB 200 MG/1
200 CAPSULE ORAL DAILY
Qty: 90 CAPSULE | Refills: 1 | Status: SHIPPED | OUTPATIENT
Start: 2024-09-18

## 2024-11-29 DIAGNOSIS — I10 PRIMARY HYPERTENSION: ICD-10-CM

## 2024-11-29 DIAGNOSIS — F41.9 ANXIETY: ICD-10-CM

## 2024-12-02 RX ORDER — SERTRALINE HYDROCHLORIDE 100 MG/1
100 TABLET, FILM COATED ORAL DAILY
Qty: 90 TABLET | Refills: 0 | Status: SHIPPED | OUTPATIENT
Start: 2024-12-02

## 2024-12-02 RX ORDER — LISINOPRIL 10 MG/1
10 TABLET ORAL EVERY EVENING
Qty: 90 TABLET | Refills: 2 | Status: SHIPPED | OUTPATIENT
Start: 2024-12-02

## 2025-01-28 ENCOUNTER — OFFICE VISIT (OUTPATIENT)
Dept: FAMILY MEDICINE CLINIC | Facility: CLINIC | Age: 70
End: 2025-01-28
Payer: COMMERCIAL

## 2025-01-28 VITALS
HEIGHT: 60 IN | HEART RATE: 77 BPM | SYSTOLIC BLOOD PRESSURE: 116 MMHG | TEMPERATURE: 97.3 F | DIASTOLIC BLOOD PRESSURE: 72 MMHG | BODY MASS INDEX: 40.4 KG/M2 | OXYGEN SATURATION: 98 % | WEIGHT: 205.8 LBS

## 2025-01-28 DIAGNOSIS — R10.31 ABDOMINAL DISCOMFORT IN RIGHT LOWER QUADRANT: ICD-10-CM

## 2025-01-28 DIAGNOSIS — R53.83 FATIGUE, UNSPECIFIED TYPE: ICD-10-CM

## 2025-01-28 DIAGNOSIS — I10 PRIMARY HYPERTENSION: ICD-10-CM

## 2025-01-28 DIAGNOSIS — R19.7 DIARRHEA, UNSPECIFIED TYPE: Primary | ICD-10-CM

## 2025-01-28 PROCEDURE — 99214 OFFICE O/P EST MOD 30 MIN: CPT | Performed by: NURSE PRACTITIONER

## 2025-01-28 NOTE — PATIENT INSTRUCTIONS
Continue increased intake of clear liquids and rest.  Continue bland diet--bananas, rice, toast, etc.  Continue daily probiotic.  Follow up pending lab results.  Follow up if symptoms persist or worsen.

## 2025-01-28 NOTE — PROGRESS NOTES
Subjective   Risa Jimenez is a 69 y.o. female.     Chief Complaint   Patient presents with    Diarrhea     Since between christmas and new years she has had daily diarrhea. She said when she eats it comes out within the hour and she can see what she eats and it does not look digested    Abdominal Pain     She is having some dull aching in her right side of her abdomin along with the diarrhea. She said she has had this feeling before but didn't have the diarrhea and she had a cat scan done but it was normal.     Fatigue     She says she does not feel sick but she feels very fatigued       History of Present Illness   Patient presents with c/o diarrhea; has had ongoing diarrhea since end of December; had not eaten very healthy over the holidays; notes will have diarrhea within hour of eating and food does not look digested; symptoms will get better and then worse again; has had some dull aching in right lower abdomen as well; no malaise, but does feel very tired; slept much of day yesterday; has had a lot of gas and rumbling; stool has different odor; no blood in BM; no recent antibiotics; tried 5 doses of Ivermectin about 10 days ago due to concern may have a parasite and thought had felt better at first, but symptoms persisted; will take Kaopectate if has to go out and helps short term; no fever; has felt weak at times; has tried bland diet with bananas, rice, and chicken broth; used to love to eat apples daily, but has not liked taste recently; wondering if has had trouble with small bowel overgrowth for some time.    F/U HTN: takes Lisinopril daily; does not typically monitor BP; slight headaches at times; has had some orthostasis since has had diarrhea; no swelling.        The following portions of the patient's history were reviewed and updated as appropriate: allergies, current medications, past family history, past medical history, past social history, past surgical history and problem list.    Current  Outpatient Medications on File Prior to Visit   Medication Sig    celecoxib (CeleBREX) 200 MG capsule Take 1 capsule by mouth Daily.    Cholecalciferol (VITAMIN D-3 PO) Take  by mouth.    fluticasone (FLONASE) 50 MCG/ACT nasal spray Administer 2 sprays into the nostril(s) as directed by provider Daily.    lisinopril (PRINIVIL,ZESTRIL) 10 MG tablet TAKE ONE TABLET BY MOUTH EVERY EVENING    Loratadine 10 MG capsule Take 1 capsule by mouth Daily.    MAGNESIUM PO Take  by mouth.    sertraline (ZOLOFT) 100 MG tablet TAKE ONE TABLET BY MOUTH EVERY DAY     No current facility-administered medications on file prior to visit.        Past Medical History:   Diagnosis Date    Abdominal discomfort, epigastric 07/14/2022    Allergic     Allergic rhinitis     Anxiety     Arthritis     Chronic pain     Chronic pain of left knee 05/02/2017    High cholesterol     History of total knee arthroplasty, left 05/02/2018    Hypertension     Left knee pain     Left leg pain     Prediabetes     S/P total knee arthroplasty, left 11/28/2017    Sciatic leg pain     LEFT    Status post total left knee replacement 01/03/2018       Past Surgical History:   Procedure Laterality Date    BACK SURGERY  2012    Micro-Discectomy L5-S1 - Boom Smith MD    BREAST BIOPSY Right 2000    benign--fibrocystic    CHOLECYSTECTOMY  2002    COLONOSCOPY  2009    JOINT REPLACEMENT Right 2015    LASIK  1999    SINUS SURGERY  2005    and 2010    TOTAL KNEE ARTHROPLASTY Left 11/15/2017    Procedure: LEFT TOTAL KNEE ARTHROPLASTY WITH SARAH NAVIGATION; Jose Muñoz MD;   [facility]    TOTAL KNEE ARTHROPLASTY Right 2015       Family History   Problem Relation Age of Onset    Hypertension Mother     Arthritis Mother         Rheumatoid dx at 82    Rheum arthritis Mother     Hypertension Father     Hypertension Sister     Arthritis Sister         Dx 63 2022    Rheum arthritis Sister     Malig Hyperthermia Neg Hx        Social History     Socioeconomic History  "   Marital status: Single   Tobacco Use    Smoking status: Never    Smokeless tobacco: Never   Substance and Sexual Activity    Alcohol use: Yes     Alcohol/week: 1.0 standard drink of alcohol     Types: 1 Glasses of wine per week     Comment: Rare    Drug use: Never    Sexual activity: Never       Review of Systems   Constitutional:  Positive for fatigue. Negative for fever and unexpected weight loss. Appetite change: some decrease in appetite, cannot eat certain things, loves vegetables, but cannot eat those with current diarrhea or will upset stomach.  HENT:  Negative for trouble swallowing.    Respiratory:  Negative for cough, chest tightness and shortness of breath.    Cardiovascular:  Negative for chest pain and palpitations.   Gastrointestinal:  Positive for abdominal pain (see HPI) and diarrhea. Negative for nausea and vomiting.   Genitourinary:  Negative for decreased urine volume, frequency and hematuria.   Musculoskeletal:  Positive for arthralgias. Negative for myalgias.   Skin:  Negative for rash.   Neurological:  Positive for headache (see HPI).       Objective   Vitals:    01/28/25 1345   BP: 116/72   BP Location: Left arm   Patient Position: Sitting   Cuff Size: Large Adult   Pulse: 77   Temp: 97.3 °F (36.3 °C)   TempSrc: Temporal   SpO2: 98%   Weight: 93.4 kg (205 lb 12.8 oz)   Height: 152.4 cm (60\")     Body mass index is 40.19 kg/m².    Physical Exam  Vitals and nursing note reviewed.   Constitutional:       General: She is not in acute distress.     Appearance: She is well-developed and well-groomed. She is not diaphoretic.   HENT:      Head: Normocephalic.      Right Ear: External ear normal.      Left Ear: External ear normal.   Eyes:      Conjunctiva/sclera: Conjunctivae normal.   Neck:      Vascular: No carotid bruit.   Cardiovascular:      Rate and Rhythm: Normal rate and regular rhythm.      Pulses: Normal pulses.      Heart sounds: Normal heart sounds. No murmur heard.  Pulmonary:      " Effort: Pulmonary effort is normal. No respiratory distress.      Breath sounds: Normal breath sounds.   Abdominal:      General: Bowel sounds are normal.      Palpations: Abdomen is soft. There is no hepatomegaly or splenomegaly.      Tenderness: There is no abdominal tenderness. There is no guarding or rebound.   Musculoskeletal:      Cervical back: Normal range of motion and neck supple. No bony tenderness.      Thoracic back: No bony tenderness.      Lumbar back: No bony tenderness.      Right lower leg: No edema.      Left lower leg: No edema.   Skin:     General: Skin is warm and dry.      Findings: No rash.   Neurological:      Mental Status: She is alert and oriented to person, place, and time.      Gait: Gait normal.   Psychiatric:         Mood and Affect: Mood normal.         Behavior: Behavior normal.         Thought Content: Thought content normal.         Cognition and Memory: Cognition normal.         Judgment: Judgment normal.         Lab Results   Component Value Date    WBC 7.2 09/11/2024    RBC 4.10 09/11/2024    HGB 13.0 09/11/2024    HCT 38.6 09/11/2024    MCV 94 09/11/2024    MCH 31.7 09/11/2024    MCHC 33.7 09/11/2024    RDW 12.4 09/11/2024    RDWSD 44.6 11/07/2017    MPV 9.5 11/07/2017     09/11/2024    NEUTRORELPCT 59 09/11/2024    LYMPHORELPCT 29 09/11/2024    MONORELPCT 7 09/11/2024    EOSRELPCT 4 09/11/2024    BASORELPCT 1 09/11/2024    NEUTROABS 4.3 09/11/2024    LYMPHSABS 2.1 09/11/2024    MONOSABS 0.5 09/11/2024    EOSABS 0.3 09/11/2024    BASOSABS 0.1 09/11/2024     Lab Results   Component Value Date    GLUCOSE 90 09/11/2024    BUN 22 09/11/2024    CREATININE 0.77 09/11/2024    EGFRIFNONA 83 11/16/2017    BCR 29 (H) 09/11/2024    K 4.7 09/11/2024    CO2 25 09/11/2024    CALCIUM 10.0 09/11/2024    PROTENTOTREF 7.2 09/11/2024    ALBUMIN 4.4 09/11/2024    LABIL2 1.6 07/28/2023    AST 17 09/11/2024    ALT 17 09/11/2024      Lab Results   Component Value Date    CHLPL 295 (H)  07/28/2023    TRIG 200 (H) 07/28/2023    HDL 46 07/28/2023    VLDL 39 07/28/2023     (H) 07/28/2023     Lab Results   Component Value Date    TSH 2.020 07/28/2023     Lab Results   Component Value Date    HGBA1C 5.3 07/28/2023     Lab Results   Component Value Date    LABPH 7.5 05/12/2015    COLORU Yellow 11/07/2017    CLARITYU Clear 11/07/2017    LEUKOCYTESUR Negative 11/07/2017    GLUCOSEU Negative 11/07/2017    BLOODU Negative 11/07/2017    BILIRUBINUR Negative 11/07/2017    NITRITEU Negative 11/07/2017          Assessment    Problem List Items Addressed This Visit       Hypertension    Current Assessment & Plan     Hypertension is stable and controlled  Continue current treatment regimen.  Weight loss.  Regular aerobic exercise.  Ambulatory blood pressure monitoring.  Blood pressure will be reassessed in 3 months.  Continue Lisinopril daily.         Relevant Orders    Comprehensive Metabolic Panel    CBC & Differential    Fatigue    Relevant Orders    Comprehensive Metabolic Panel    CBC & Differential    TSH Rfx On Abnormal To Free T4    Diarrhea - Primary    Current Assessment & Plan     Continue increased intake of clear liquids and rest.  Continue bland diet--bananas, rice, toast, etc.  Continue daily probiotic.         Relevant Orders    Clostridioides difficile EIA - Stool, Per Rectum    Stool Culture (Reference Lab) - Stool, Per Rectum    Comprehensive Metabolic Panel    CBC & Differential    TSH Rfx On Abnormal To Free T4    Ambulatory Referral to Gastroenterology    Abdominal discomfort in right lower quadrant    Current Assessment & Plan     Continue increased intake of clear liquids and rest.         Relevant Orders    Comprehensive Metabolic Panel    CBC & Differential        Return in about 3 months (around 4/28/2025) for Annual physical, Recheck.or sooner if symptoms persist or worsen.  Will consider CT abd/pelvis pending lab results.

## 2025-01-29 DIAGNOSIS — R19.7 DIARRHEA, UNSPECIFIED TYPE: ICD-10-CM

## 2025-01-29 LAB
ALBUMIN SERPL-MCNC: 4.4 G/DL (ref 3.8–4.8)
ALP SERPL-CCNC: 74 IU/L (ref 44–121)
ALT SERPL-CCNC: 21 IU/L (ref 0–32)
AST SERPL-CCNC: 17 IU/L (ref 0–40)
BASOPHILS # BLD AUTO: 0 X10E3/UL (ref 0–0.2)
BASOPHILS NFR BLD AUTO: 1 %
BILIRUB SERPL-MCNC: 0.2 MG/DL (ref 0–1.2)
BUN SERPL-MCNC: 25 MG/DL (ref 8–27)
BUN/CREAT SERPL: 32 (ref 12–28)
CALCIUM SERPL-MCNC: 9.8 MG/DL (ref 8.7–10.3)
CHLORIDE SERPL-SCNC: 102 MMOL/L (ref 96–106)
CO2 SERPL-SCNC: 22 MMOL/L (ref 20–29)
CREAT SERPL-MCNC: 0.78 MG/DL (ref 0.57–1)
EGFRCR SERPLBLD CKD-EPI 2021: 81 ML/MIN/1.73
EOSINOPHIL # BLD AUTO: 0.1 X10E3/UL (ref 0–0.4)
EOSINOPHIL NFR BLD AUTO: 2 %
ERYTHROCYTE [DISTWIDTH] IN BLOOD BY AUTOMATED COUNT: 12.6 % (ref 11.7–15.4)
GLOBULIN SER CALC-MCNC: 2.7 G/DL (ref 1.5–4.5)
GLUCOSE SERPL-MCNC: 95 MG/DL (ref 70–99)
HCT VFR BLD AUTO: 37.2 % (ref 34–46.6)
HGB BLD-MCNC: 12.6 G/DL (ref 11.1–15.9)
IMM GRANULOCYTES # BLD AUTO: 0 X10E3/UL (ref 0–0.1)
IMM GRANULOCYTES NFR BLD AUTO: 0 %
LYMPHOCYTES # BLD AUTO: 1.4 X10E3/UL (ref 0.7–3.1)
LYMPHOCYTES NFR BLD AUTO: 21 %
MCH RBC QN AUTO: 31.2 PG (ref 26.6–33)
MCHC RBC AUTO-ENTMCNC: 33.9 G/DL (ref 31.5–35.7)
MCV RBC AUTO: 92 FL (ref 79–97)
MONOCYTES # BLD AUTO: 0.4 X10E3/UL (ref 0.1–0.9)
MONOCYTES NFR BLD AUTO: 6 %
NEUTROPHILS # BLD AUTO: 4.9 X10E3/UL (ref 1.4–7)
NEUTROPHILS NFR BLD AUTO: 70 %
PLATELET # BLD AUTO: 287 X10E3/UL (ref 150–450)
POTASSIUM SERPL-SCNC: 4.9 MMOL/L (ref 3.5–5.2)
PROT SERPL-MCNC: 7.1 G/DL (ref 6–8.5)
RBC # BLD AUTO: 4.04 X10E6/UL (ref 3.77–5.28)
SODIUM SERPL-SCNC: 140 MMOL/L (ref 134–144)
TSH SERPL DL<=0.005 MIU/L-ACNC: 1.5 UIU/ML (ref 0.45–4.5)
WBC # BLD AUTO: 6.9 X10E3/UL (ref 3.4–10.8)

## 2025-01-29 NOTE — ASSESSMENT & PLAN NOTE
Continue increased intake of clear liquids and rest.  Continue bland diet--bananas, rice, toast, etc.  Continue daily probiotic.

## 2025-01-29 NOTE — ASSESSMENT & PLAN NOTE
Hypertension is stable and controlled  Continue current treatment regimen.  Weight loss.  Regular aerobic exercise.  Ambulatory blood pressure monitoring.  Blood pressure will be reassessed in 3 months.  Continue Lisinopril daily.

## 2025-02-02 LAB
BACTERIA SPEC CULT: NORMAL
BACTERIA SPEC CULT: NORMAL
C DIFF TOX A+B STL QL IA: NEGATIVE
CAMPYLOBACTER STL CULT: NORMAL
E COLI SXT STL QL IA: NEGATIVE
SALM + SHIG STL CULT: NORMAL

## 2025-02-14 ENCOUNTER — OFFICE (OUTPATIENT)
Dept: URBAN - METROPOLITAN AREA CLINIC 65 | Facility: CLINIC | Age: 70
End: 2025-02-14

## 2025-02-14 VITALS
OXYGEN SATURATION: 96 % | DIASTOLIC BLOOD PRESSURE: 80 MMHG | HEIGHT: 60 IN | WEIGHT: 200 LBS | HEART RATE: 70 BPM | SYSTOLIC BLOOD PRESSURE: 124 MMHG

## 2025-02-14 DIAGNOSIS — R10.31 RIGHT LOWER QUADRANT PAIN: ICD-10-CM

## 2025-02-14 DIAGNOSIS — R19.7 DIARRHEA, UNSPECIFIED: ICD-10-CM

## 2025-02-14 DIAGNOSIS — K59.01 SLOW TRANSIT CONSTIPATION: ICD-10-CM

## 2025-02-14 PROCEDURE — 99204 OFFICE O/P NEW MOD 45 MIN: CPT | Performed by: INTERNAL MEDICINE

## 2025-02-20 DIAGNOSIS — F41.9 ANXIETY: ICD-10-CM

## 2025-02-21 RX ORDER — SERTRALINE HYDROCHLORIDE 100 MG/1
100 TABLET, FILM COATED ORAL DAILY
Qty: 90 TABLET | Refills: 0 | Status: SHIPPED | OUTPATIENT
Start: 2025-02-21

## 2025-03-10 ENCOUNTER — OFFICE (OUTPATIENT)
Dept: URBAN - METROPOLITAN AREA PATHOLOGY 4 | Facility: PATHOLOGY | Age: 70
End: 2025-03-10
Payer: COMMERCIAL

## 2025-03-10 ENCOUNTER — AMBULATORY SURGICAL CENTER (OUTPATIENT)
Dept: URBAN - METROPOLITAN AREA SURGERY 20 | Facility: SURGERY | Age: 70
End: 2025-03-10
Payer: COMMERCIAL

## 2025-03-10 VITALS — HEIGHT: 60 IN

## 2025-03-10 DIAGNOSIS — D12.5 BENIGN NEOPLASM OF SIGMOID COLON: ICD-10-CM

## 2025-03-10 DIAGNOSIS — K57.30 DIVERTICULOSIS OF LARGE INTESTINE WITHOUT PERFORATION OR ABS: ICD-10-CM

## 2025-03-10 DIAGNOSIS — K64.1 SECOND DEGREE HEMORRHOIDS: ICD-10-CM

## 2025-03-10 DIAGNOSIS — Z12.11 ENCOUNTER FOR SCREENING FOR MALIGNANT NEOPLASM OF COLON: ICD-10-CM

## 2025-03-10 PROBLEM — K63.5 POLYP OF COLON: Status: ACTIVE | Noted: 2025-03-10

## 2025-03-10 LAB
GI HISTOLOGY: A. SIGMOID POLYP: (no result)
GI HISTOLOGY: PDF REPORT: (no result)

## 2025-03-10 PROCEDURE — 45380 COLONOSCOPY AND BIOPSY: CPT | Mod: 33 | Performed by: INTERNAL MEDICINE

## 2025-03-10 PROCEDURE — 88305 TISSUE EXAM BY PATHOLOGIST: CPT | Performed by: PATHOLOGY

## 2025-05-19 DIAGNOSIS — F41.9 ANXIETY: ICD-10-CM

## 2025-05-19 DIAGNOSIS — I10 PRIMARY HYPERTENSION: ICD-10-CM

## 2025-05-19 RX ORDER — SERTRALINE HYDROCHLORIDE 100 MG/1
100 TABLET, FILM COATED ORAL DAILY
Qty: 90 TABLET | Refills: 0 | Status: SHIPPED | OUTPATIENT
Start: 2025-05-19

## 2025-05-19 RX ORDER — LISINOPRIL 10 MG/1
10 TABLET ORAL EVERY EVENING
Qty: 90 TABLET | Refills: 0 | Status: SHIPPED | OUTPATIENT
Start: 2025-05-19

## 2025-08-11 DIAGNOSIS — F41.9 ANXIETY: ICD-10-CM

## 2025-08-11 RX ORDER — SERTRALINE HYDROCHLORIDE 100 MG/1
100 TABLET, FILM COATED ORAL DAILY
Qty: 30 TABLET | Refills: 0 | Status: SHIPPED | OUTPATIENT
Start: 2025-08-11

## (undated) DEVICE — PAD CAST SOF ROL NS 6IN

## (undated) DEVICE — SYR LUERLOK 30CC

## (undated) DEVICE — NDL SPINE 18G 31/2IN PNK

## (undated) DEVICE — GLV SURG SENSICARE MICRO PF LF 8 STRL

## (undated) DEVICE — GLV SURG TRIUMPH CLASSIC PF LTX 9 STRL

## (undated) DEVICE — PAD,ABDOMINAL,8"X10",ST,LF: Brand: MEDLINE

## (undated) DEVICE — BNDG ELAS ELITE V/CLOSE 6IN 5YD LF STRL

## (undated) DEVICE — INSTRUMENT BATTERY

## (undated) DEVICE — BNDG COMPR TENSOPLAST ELASTC ADHS 6IN 5YD TAN

## (undated) DEVICE — APPL CHLORAPREP W/TINT 26ML ORNG

## (undated) DEVICE — UNDERCAST PADDING: Brand: DEROYAL

## (undated) DEVICE — PREMIUM WET SKIN PREP TRAY: Brand: MEDLINE INDUSTRIES, INC.

## (undated) DEVICE — PK KN TOTL 40

## (undated) DEVICE — GLV SURG TRIUMPH CLASSIC PF LTX 8 STRL

## (undated) DEVICE — DRSNG PAD ABD 8X10IN STRL

## (undated) DEVICE — SUT VICRYL 1 CT1 27IN  JJ40G

## (undated) DEVICE — DUAL CUT SAGITTAL BLADE

## (undated) DEVICE — MAT FLR ABSORBENT LG 4FT 10 2.5FT

## (undated) DEVICE — GAUZE,SPONGE,FLUFF,6"X6.75",STRL,10/TRAY: Brand: MEDLINE

## (undated) DEVICE — OCCLUSIVE GAUZE STRIP,3% BISMUTH TRIBROMOPHENATE IN PETROLATUM BLEND: Brand: XEROFORM

## (undated) DEVICE — COVER,MAYO STAND,STERILE: Brand: MEDLINE

## (undated) DEVICE — ENCORE® LATEX ORTHO SIZE 9, STERILE LATEX POWDER-FREE SURGICAL GLOVE: Brand: ENCORE

## (undated) DEVICE — P.F.C. DRILL BIT AND STEINMAN PIN PACKET (1 UNIT) .125IN DIA 5IN LGTH: Brand: P.F.C.

## (undated) DEVICE — STPLR SKIN VISISTAT WD 35CT

## (undated) DEVICE — ENCORE® LATEX ORTHO SIZE 8, STERILE LATEX POWDER-FREE SURGICAL GLOVE: Brand: ENCORE